# Patient Record
Sex: MALE | Race: WHITE | NOT HISPANIC OR LATINO | URBAN - METROPOLITAN AREA
[De-identification: names, ages, dates, MRNs, and addresses within clinical notes are randomized per-mention and may not be internally consistent; named-entity substitution may affect disease eponyms.]

---

## 2020-08-23 ENCOUNTER — INPATIENT (INPATIENT)
Facility: HOSPITAL | Age: 45
LOS: 1 days | Discharge: HOME | DRG: 872 | End: 2020-08-25
Admitting: HOSPITALIST
Payer: OTHER MISCELLANEOUS

## 2020-08-23 VITALS
SYSTOLIC BLOOD PRESSURE: 119 MMHG | DIASTOLIC BLOOD PRESSURE: 76 MMHG | RESPIRATION RATE: 18 BRPM | TEMPERATURE: 101 F | OXYGEN SATURATION: 99 % | WEIGHT: 244.93 LBS | HEART RATE: 99 BPM | HEIGHT: 73 IN

## 2020-08-23 LAB
ALBUMIN SERPL ELPH-MCNC: 3.6 G/DL — SIGNIFICANT CHANGE UP (ref 3.4–5)
ALP SERPL-CCNC: 56 U/L — SIGNIFICANT CHANGE UP (ref 40–120)
ALT FLD-CCNC: 41 U/L — SIGNIFICANT CHANGE UP (ref 12–42)
ANION GAP SERPL CALC-SCNC: 8 MMOL/L — LOW (ref 9–16)
APPEARANCE UR: CLEAR — SIGNIFICANT CHANGE UP
APTT BLD: 24.5 SEC — LOW (ref 27.5–35.5)
AST SERPL-CCNC: 32 U/L — SIGNIFICANT CHANGE UP (ref 15–37)
BASOPHILS # BLD AUTO: 0.05 K/UL — SIGNIFICANT CHANGE UP (ref 0–0.2)
BASOPHILS NFR BLD AUTO: 0.4 % — SIGNIFICANT CHANGE UP (ref 0–2)
BILIRUB SERPL-MCNC: 0.6 MG/DL — SIGNIFICANT CHANGE UP (ref 0.2–1.2)
BILIRUB UR-MCNC: NEGATIVE — SIGNIFICANT CHANGE UP
BUN SERPL-MCNC: 11 MG/DL — SIGNIFICANT CHANGE UP (ref 7–23)
CALCIUM SERPL-MCNC: 8.8 MG/DL — SIGNIFICANT CHANGE UP (ref 8.5–10.5)
CHLORIDE SERPL-SCNC: 101 MMOL/L — SIGNIFICANT CHANGE UP (ref 96–108)
CO2 SERPL-SCNC: 27 MMOL/L — SIGNIFICANT CHANGE UP (ref 22–31)
COLOR SPEC: YELLOW — SIGNIFICANT CHANGE UP
CREAT SERPL-MCNC: 1.04 MG/DL — SIGNIFICANT CHANGE UP (ref 0.5–1.3)
DIFF PNL FLD: NEGATIVE — SIGNIFICANT CHANGE UP
EOSINOPHIL # BLD AUTO: 0.03 K/UL — SIGNIFICANT CHANGE UP (ref 0–0.5)
EOSINOPHIL NFR BLD AUTO: 0.3 % — SIGNIFICANT CHANGE UP (ref 0–6)
GLUCOSE SERPL-MCNC: 104 MG/DL — HIGH (ref 70–99)
GLUCOSE UR QL: NEGATIVE — SIGNIFICANT CHANGE UP
HCT VFR BLD CALC: 41.2 % — SIGNIFICANT CHANGE UP (ref 39–50)
HGB BLD-MCNC: 14.8 G/DL — SIGNIFICANT CHANGE UP (ref 13–17)
IMM GRANULOCYTES NFR BLD AUTO: 0.5 % — SIGNIFICANT CHANGE UP (ref 0–1.5)
INR BLD: 1.13 — SIGNIFICANT CHANGE UP (ref 0.88–1.16)
KETONES UR-MCNC: NEGATIVE — SIGNIFICANT CHANGE UP
LACTATE SERPL-SCNC: 0.7 MMOL/L — SIGNIFICANT CHANGE UP (ref 0.4–2)
LEUKOCYTE ESTERASE UR-ACNC: NEGATIVE — SIGNIFICANT CHANGE UP
LYMPHOCYTES # BLD AUTO: 0.88 K/UL — LOW (ref 1–3.3)
LYMPHOCYTES # BLD AUTO: 7.5 % — LOW (ref 13–44)
MCHC RBC-ENTMCNC: 32.4 PG — SIGNIFICANT CHANGE UP (ref 27–34)
MCHC RBC-ENTMCNC: 35.9 GM/DL — SIGNIFICANT CHANGE UP (ref 32–36)
MCV RBC AUTO: 90.2 FL — SIGNIFICANT CHANGE UP (ref 80–100)
MONOCYTES # BLD AUTO: 0.74 K/UL — SIGNIFICANT CHANGE UP (ref 0–0.9)
MONOCYTES NFR BLD AUTO: 6.3 % — SIGNIFICANT CHANGE UP (ref 2–14)
NEUTROPHILS # BLD AUTO: 10 K/UL — HIGH (ref 1.8–7.4)
NEUTROPHILS NFR BLD AUTO: 85 % — HIGH (ref 43–77)
NITRITE UR-MCNC: NEGATIVE — SIGNIFICANT CHANGE UP
NRBC # BLD: 0 /100 WBCS — SIGNIFICANT CHANGE UP (ref 0–0)
PH UR: 6 — SIGNIFICANT CHANGE UP (ref 5–8)
PLATELET # BLD AUTO: 192 K/UL — SIGNIFICANT CHANGE UP (ref 150–400)
POTASSIUM SERPL-MCNC: 3.8 MMOL/L — SIGNIFICANT CHANGE UP (ref 3.5–5.3)
POTASSIUM SERPL-SCNC: 3.8 MMOL/L — SIGNIFICANT CHANGE UP (ref 3.5–5.3)
PROT SERPL-MCNC: 7.3 G/DL — SIGNIFICANT CHANGE UP (ref 6.4–8.2)
PROT UR-MCNC: NEGATIVE MG/DL — SIGNIFICANT CHANGE UP
PROTHROM AB SERPL-ACNC: 13.3 SEC — SIGNIFICANT CHANGE UP (ref 10.6–13.6)
RBC # BLD: 4.57 M/UL — SIGNIFICANT CHANGE UP (ref 4.2–5.8)
RBC # FLD: 13.1 % — SIGNIFICANT CHANGE UP (ref 10.3–14.5)
SARS-COV-2 RNA SPEC QL NAA+PROBE: SIGNIFICANT CHANGE UP
SODIUM SERPL-SCNC: 136 MMOL/L — SIGNIFICANT CHANGE UP (ref 132–145)
SP GR SPEC: 1.01 — SIGNIFICANT CHANGE UP (ref 1–1.03)
UROBILINOGEN FLD QL: 0.2 E.U./DL — SIGNIFICANT CHANGE UP
WBC # BLD: 11.76 K/UL — HIGH (ref 3.8–10.5)
WBC # FLD AUTO: 11.76 K/UL — HIGH (ref 3.8–10.5)

## 2020-08-23 PROCEDURE — 93010 ELECTROCARDIOGRAM REPORT: CPT

## 2020-08-23 PROCEDURE — 73130 X-RAY EXAM OF HAND: CPT | Mod: 26,LT

## 2020-08-23 PROCEDURE — 99218: CPT

## 2020-08-23 RX ORDER — AMPICILLIN SODIUM AND SULBACTAM SODIUM 250; 125 MG/ML; MG/ML
3 INJECTION, POWDER, FOR SUSPENSION INTRAMUSCULAR; INTRAVENOUS ONCE
Refills: 0 | Status: COMPLETED | OUTPATIENT
Start: 2020-08-23 | End: 2020-08-23

## 2020-08-23 RX ORDER — ACETAMINOPHEN 500 MG
975 TABLET ORAL ONCE
Refills: 0 | Status: COMPLETED | OUTPATIENT
Start: 2020-08-23 | End: 2020-08-23

## 2020-08-23 RX ORDER — AZTREONAM 2 G
1 VIAL (EA) INJECTION
Qty: 14 | Refills: 0
Start: 2020-08-23 | End: 2020-08-29

## 2020-08-23 RX ORDER — VANCOMYCIN HCL 1 G
1500 VIAL (EA) INTRAVENOUS ONCE
Refills: 0 | Status: COMPLETED | OUTPATIENT
Start: 2020-08-23 | End: 2020-08-23

## 2020-08-23 RX ORDER — KETOROLAC TROMETHAMINE 30 MG/ML
15 SYRINGE (ML) INJECTION ONCE
Refills: 0 | Status: DISCONTINUED | OUTPATIENT
Start: 2020-08-23 | End: 2020-08-24

## 2020-08-23 RX ORDER — SODIUM CHLORIDE 9 MG/ML
2500 INJECTION INTRAMUSCULAR; INTRAVENOUS; SUBCUTANEOUS ONCE
Refills: 0 | Status: COMPLETED | OUTPATIENT
Start: 2020-08-23 | End: 2020-08-23

## 2020-08-23 RX ORDER — KETOROLAC TROMETHAMINE 30 MG/ML
15 SYRINGE (ML) INJECTION ONCE
Refills: 0 | Status: DISCONTINUED | OUTPATIENT
Start: 2020-08-23 | End: 2020-08-23

## 2020-08-23 RX ADMIN — Medication 300 MILLIGRAM(S): at 10:30

## 2020-08-23 RX ADMIN — Medication 300 MILLIGRAM(S): at 23:11

## 2020-08-23 RX ADMIN — AMPICILLIN SODIUM AND SULBACTAM SODIUM 200 GRAM(S): 250; 125 INJECTION, POWDER, FOR SUSPENSION INTRAMUSCULAR; INTRAVENOUS at 22:34

## 2020-08-23 RX ADMIN — Medication 15 MILLIGRAM(S): at 10:24

## 2020-08-23 RX ADMIN — Medication 975 MILLIGRAM(S): at 22:34

## 2020-08-23 RX ADMIN — Medication 15 MILLIGRAM(S): at 20:35

## 2020-08-23 RX ADMIN — Medication 975 MILLIGRAM(S): at 20:35

## 2020-08-23 RX ADMIN — AMPICILLIN SODIUM AND SULBACTAM SODIUM 3 GRAM(S): 250; 125 INJECTION, POWDER, FOR SUSPENSION INTRAMUSCULAR; INTRAVENOUS at 23:11

## 2020-08-23 RX ADMIN — Medication 975 MILLIGRAM(S): at 20:50

## 2020-08-23 RX ADMIN — Medication 975 MILLIGRAM(S): at 10:21

## 2020-08-23 RX ADMIN — SODIUM CHLORIDE 2500 MILLILITER(S): 9 INJECTION INTRAMUSCULAR; INTRAVENOUS; SUBCUTANEOUS at 10:22

## 2020-08-23 NOTE — ED CDU PROVIDER DISPOSITION NOTE - CLINICAL COURSE
Observed in the ED with LUE elevation for cellulitis with persistent fever, body aches.  Added Unasyn to antibiotic regimen.  No tachycardia or hypotension.  Stable for RMF

## 2020-08-23 NOTE — ED CDU PROVIDER INITIAL DAY NOTE - MEDICAL DECISION MAKING DETAILS
Observed in the ED with worsening symptoms, fever, chills, body aches.  Did spike fever here.  Will add Unasyn and additional Toradol.  Will admit to [facility] for IV antibiotics.

## 2020-08-23 NOTE — ED PROVIDER NOTE - PROGRESS NOTE DETAILS
Labs and imaging reviewed. Slight leukocytosis with left shift, normal lactate, normal CMP. Hand x-ray without evidence of foreign body or fracture. Covid swab negative. Patient is feeling subjectively better. Place LUE in sling. Consider admission vs observation. Pt placed on observation for wound check, elevation, antibiotics, and cardiac monitoring. Labs and imaging reviewed. Slight leukocytosis with left shift, normal lactate, normal CMP. Hand x-ray without evidence of foreign body or fracture. Covid swab negative. Patient is feeling subjectively better. Place LUE in sling. Discussed admission vs observation. Pt placed on observation for wound check, elevation, antibiotics, and cardiac monitoring.

## 2020-08-23 NOTE — ED CDU PROVIDER INITIAL DAY NOTE - OBJECTIVE STATEMENT
Patient is a 45 year old male with no PMH presenting today reporting that he woke up this morning with pain and swelling of his left hand. He applied warm compress and the blister popped with some purulent drainage. He then developed pain, redness, swelling, body aches, and chills and presented to the ED for evaluation. Patient denies cough, vomiting, diarrhea, rash, no travel history, no known sick contacts, no known contact with known or suspected COVID19 positive persons, denies known insect bites. Patient works as a  and denies any recent injury.

## 2020-08-23 NOTE — ED PROVIDER NOTE - CLINICAL SUMMARY MEDICAL DECISION MAKING FREE TEXT BOX
Patient is a 45 year old male with no PMH presenting with left hand cellulitis. Triggered a sepsis alert on arrival due to fever and tachycardia. Gave adjusted weight based fluid, antibiotics, and pain medications, and ordered labs. Will keep his arm elevated, give IV antibiotics and consider admission from observation for cellulitis and sepsis. Have ordered COVID19 swab for admission purposes.

## 2020-08-23 NOTE — ED PROVIDER NOTE - CONSTITUTIONAL, MLM
normal... Well appearing, awake, alert, oriented to person, place, time/situation and in no apparent distress. Uncomfortable appearing,  awake, alert, oriented to person, place, time/situation and in no apparent distress.

## 2020-08-23 NOTE — ED CDU PROVIDER INITIAL DAY NOTE - PROGRESS NOTE DETAILS
Wound irrigated extensively with betadine, wound culture obtained and placed in bandage.  Continue to elevate arm.  Symptoms improved. Complained of chills and body aches again.  Temp spiked to 101.  Additional tylenol ordered.  No tachycardia or changes in blood pressure.  Due for second dose of IV Vancomycin at 1030 pm.  Will sign out to the evening team Patient feeling worse, Unasyn added, admitted to [facility] under Dr. Bates. Patient agreeable. Second line of streaking. No fluctuance. Arm marked with pen.

## 2020-08-23 NOTE — ED ADULT TRIAGE NOTE - CHIEF COMPLAINT QUOTE
Patient to ED with what appears to be an insect bite on left hand - area is red and cellulitic with purulent drainage.  Patient with fever and tachycardia.  Falls into sepsis protocol.  No acute

## 2020-08-23 NOTE — ED ADULT NURSE NOTE - OBJECTIVE STATEMENT
left hand redness, pain, and swelling since yesterday, also c/o fever and chills, no s/s of distress, awaiting provider eval

## 2020-08-23 NOTE — ED CDU PROVIDER INITIAL DAY NOTE - DETAILS
L hand cellulitis triggering sepsis alert on arrival.  Labs and imaging reviewed. Slight leukocytosis with left shift, normal lactate, normal CMP. Hand x-ray without evidence of foreign body or fracture. Covid swab negative. Patient is feeling subjectively better. Place LUE in sling. Pt placed on observation for wound check, elevation, antibiotics, and cardiac monitoring.

## 2020-08-23 NOTE — ED PROVIDER NOTE - NEUROLOGICAL, MLM
Alert and oriented, no focal deficits, no motor or sensory deficits. Rapid alternating movement, finger to nose. Hand: normal distal motor and sensory of medial, radial, and ulnar nerves. no pronator drift.

## 2020-08-23 NOTE — ED PROVIDER NOTE - SKIN, MLM
quarter sized ulcerated area on left dorsum on hand with surrounding erythema, lymphangitic spread up to distal and proximal arm, soft hand compartment, radial pulse +2, normal distal and capillary refill

## 2020-08-23 NOTE — ED PROVIDER NOTE - OBJECTIVE STATEMENT
Patient is a 45 year old male with no PMH presenting today reporting that he woke up this morning with pain and swelling of his left hand. He applied warm compress and the blister popped with some purulent drainage. He then developed pain, redness, swelling, body aches, and chills and presented to the ED for evaluation. Patient denies cough, vomiting, diarrhea, rash, no travel history, no known sick contacts, no known contact with known or suspect COVID19 positive persons, denies known insect bites. Patient works as a  and denies any recent injury. Patient is a 45 year old male with no PMH presenting today reporting that he woke up this morning with pain and swelling of his left hand. He applied warm compress and the blister popped with some purulent drainage. He then developed pain, redness, swelling, body aches, and chills and presented to the ED for evaluation. Patient denies cough, vomiting, diarrhea, rash, no travel history, no known sick contacts, no known contact with known or suspected COVID19 positive persons, denies known insect bites. Patient works as a  and denies any recent injury.

## 2020-08-23 NOTE — ED CDU PROVIDER DISPOSITION NOTE - ADMIT DISPOSITION PRESENT ON ADMISSION SEPSIS Q1 - RE-EVALUATED PATIENT FLUID AND VITAL SIGNS
I have re-evaluated the patient's fluid status and reviewed vital signs. Clinical perfusion assessment was performed.
Health Care Proxy (HCP)/SALINA [phone]

## 2020-08-24 ENCOUNTER — TRANSCRIPTION ENCOUNTER (OUTPATIENT)
Age: 45
End: 2020-08-24

## 2020-08-24 DIAGNOSIS — L03.114 CELLULITIS OF LEFT UPPER LIMB: ICD-10-CM

## 2020-08-24 DIAGNOSIS — A41.9 SEPSIS, UNSPECIFIED ORGANISM: ICD-10-CM

## 2020-08-24 DIAGNOSIS — R63.8 OTHER SYMPTOMS AND SIGNS CONCERNING FOOD AND FLUID INTAKE: ICD-10-CM

## 2020-08-24 LAB
ANION GAP SERPL CALC-SCNC: 6 MMOL/L — SIGNIFICANT CHANGE UP (ref 5–17)
BASOPHILS # BLD AUTO: 0.04 K/UL — SIGNIFICANT CHANGE UP (ref 0–0.2)
BASOPHILS NFR BLD AUTO: 0.4 % — SIGNIFICANT CHANGE UP (ref 0–2)
BUN SERPL-MCNC: 7 MG/DL — SIGNIFICANT CHANGE UP (ref 7–23)
CALCIUM SERPL-MCNC: 8.5 MG/DL — SIGNIFICANT CHANGE UP (ref 8.4–10.5)
CHLORIDE SERPL-SCNC: 108 MMOL/L — SIGNIFICANT CHANGE UP (ref 96–108)
CO2 SERPL-SCNC: 27 MMOL/L — SIGNIFICANT CHANGE UP (ref 22–31)
CREAT SERPL-MCNC: 0.83 MG/DL — SIGNIFICANT CHANGE UP (ref 0.5–1.3)
EOSINOPHIL # BLD AUTO: 0.17 K/UL — SIGNIFICANT CHANGE UP (ref 0–0.5)
EOSINOPHIL NFR BLD AUTO: 1.9 % — SIGNIFICANT CHANGE UP (ref 0–6)
GLUCOSE SERPL-MCNC: 101 MG/DL — HIGH (ref 70–99)
HCT VFR BLD CALC: 39.3 % — SIGNIFICANT CHANGE UP (ref 39–50)
HGB BLD-MCNC: 13.3 G/DL — SIGNIFICANT CHANGE UP (ref 13–17)
IMM GRANULOCYTES NFR BLD AUTO: 0.6 % — SIGNIFICANT CHANGE UP (ref 0–1.5)
LYMPHOCYTES # BLD AUTO: 1.15 K/UL — SIGNIFICANT CHANGE UP (ref 1–3.3)
LYMPHOCYTES # BLD AUTO: 12.8 % — LOW (ref 13–44)
MAGNESIUM SERPL-MCNC: 1.7 MG/DL — SIGNIFICANT CHANGE UP (ref 1.6–2.6)
MCHC RBC-ENTMCNC: 31.7 PG — SIGNIFICANT CHANGE UP (ref 27–34)
MCHC RBC-ENTMCNC: 33.8 GM/DL — SIGNIFICANT CHANGE UP (ref 32–36)
MCV RBC AUTO: 93.8 FL — SIGNIFICANT CHANGE UP (ref 80–100)
MONOCYTES # BLD AUTO: 0.84 K/UL — SIGNIFICANT CHANGE UP (ref 0–0.9)
MONOCYTES NFR BLD AUTO: 9.3 % — SIGNIFICANT CHANGE UP (ref 2–14)
MRSA PCR RESULT.: NEGATIVE — SIGNIFICANT CHANGE UP
NEUTROPHILS # BLD AUTO: 6.76 K/UL — SIGNIFICANT CHANGE UP (ref 1.8–7.4)
NEUTROPHILS NFR BLD AUTO: 75 % — SIGNIFICANT CHANGE UP (ref 43–77)
NRBC # BLD: 0 /100 WBCS — SIGNIFICANT CHANGE UP (ref 0–0)
PHOSPHATE SERPL-MCNC: 2.3 MG/DL — LOW (ref 2.5–4.5)
PLATELET # BLD AUTO: 160 K/UL — SIGNIFICANT CHANGE UP (ref 150–400)
POTASSIUM SERPL-MCNC: 4.1 MMOL/L — SIGNIFICANT CHANGE UP (ref 3.5–5.3)
POTASSIUM SERPL-SCNC: 4.1 MMOL/L — SIGNIFICANT CHANGE UP (ref 3.5–5.3)
RBC # BLD: 4.19 M/UL — LOW (ref 4.2–5.8)
RBC # FLD: 13.2 % — SIGNIFICANT CHANGE UP (ref 10.3–14.5)
S AUREUS DNA NOSE QL NAA+PROBE: NEGATIVE — SIGNIFICANT CHANGE UP
SODIUM SERPL-SCNC: 141 MMOL/L — SIGNIFICANT CHANGE UP (ref 135–145)
WBC # BLD: 9.01 K/UL — SIGNIFICANT CHANGE UP (ref 3.8–10.5)
WBC # FLD AUTO: 9.01 K/UL — SIGNIFICANT CHANGE UP (ref 3.8–10.5)

## 2020-08-24 PROCEDURE — 99254 IP/OBS CNSLTJ NEW/EST MOD 60: CPT | Mod: GC

## 2020-08-24 PROCEDURE — 99223 1ST HOSP IP/OBS HIGH 75: CPT | Mod: GC

## 2020-08-24 RX ORDER — ACETAMINOPHEN 500 MG
650 TABLET ORAL EVERY 4 HOURS
Refills: 0 | Status: DISCONTINUED | OUTPATIENT
Start: 2020-08-24 | End: 2020-08-25

## 2020-08-24 RX ORDER — VANCOMYCIN HCL 1 G
1500 VIAL (EA) INTRAVENOUS EVERY 12 HOURS
Refills: 0 | Status: DISCONTINUED | OUTPATIENT
Start: 2020-08-24 | End: 2020-08-24

## 2020-08-24 RX ORDER — CEPHALEXIN 500 MG
500 CAPSULE ORAL
Refills: 0 | Status: DISCONTINUED | OUTPATIENT
Start: 2020-08-24 | End: 2020-08-25

## 2020-08-24 RX ORDER — ENOXAPARIN SODIUM 100 MG/ML
40 INJECTION SUBCUTANEOUS EVERY 24 HOURS
Refills: 0 | Status: DISCONTINUED | OUTPATIENT
Start: 2020-08-24 | End: 2020-08-25

## 2020-08-24 RX ORDER — VALACYCLOVIR 500 MG/1
0 TABLET, FILM COATED ORAL
Qty: 21 | Refills: 0 | DISCHARGE

## 2020-08-24 RX ORDER — LANOLIN ALCOHOL/MO/W.PET/CERES
5 CREAM (GRAM) TOPICAL AT BEDTIME
Refills: 0 | Status: DISCONTINUED | OUTPATIENT
Start: 2020-08-24 | End: 2020-08-25

## 2020-08-24 RX ADMIN — Medication 100 MILLIGRAM(S): at 19:00

## 2020-08-24 RX ADMIN — Medication 15 MILLIGRAM(S): at 03:41

## 2020-08-24 RX ADMIN — ENOXAPARIN SODIUM 40 MILLIGRAM(S): 100 INJECTION SUBCUTANEOUS at 06:08

## 2020-08-24 RX ADMIN — Medication 500 MILLIGRAM(S): at 19:00

## 2020-08-24 RX ADMIN — Medication 500 MILLIGRAM(S): at 23:55

## 2020-08-24 RX ADMIN — Medication 1500 MILLIGRAM(S): at 01:28

## 2020-08-24 RX ADMIN — Medication 15 MILLIGRAM(S): at 04:45

## 2020-08-24 RX ADMIN — Medication 5 MILLIGRAM(S): at 23:55

## 2020-08-24 NOTE — DISCHARGE NOTE PROVIDER - CARE PROVIDER_API CALL
Mehul Thomas  INTERNAL MEDICINE  [address] [place] [address]  Phone: [phone]  Fax: [phone]  Follow Up Time:     Charlotte David  [address] [address]  Phone: [phone]  Fax: [phone]  Follow Up Time:     Kelechi Rachel [facility]   [address] [place] [address]  Phone: (   )    -  Fax: (   )    -  Follow Up Time: Mehul Thomas  INTERNAL MEDICINE  [address] [place] [address]  Phone: [phone]  Fax: [phone]  Scheduled Appointment: 09/21/2020 11:00 AM    Marv Solano  INTERNAL MEDICINE  [address] [place] [address]  Phone: [phone]  Fax: [phone]  Follow Up Time:

## 2020-08-24 NOTE — PROGRESS NOTE ADULT - PROBLEM SELECTOR PLAN 1
Sxs of fevers, chills, malaise, and left hand cellulitis w/purulent carbuncle with concern of erythema spreading to LUE @ [facility], no hx of trauma/IVDA, vitals sig for 101.2F, HR 99, marked erythema of left hand up to left AC, Carbuncle expressing purulent exudate on exam however no fluctuance noted, LUE neurovascularly intact, lactate 0.7 and has UOP, left hand XR no evidence of fx, sepsis likely 2/2 to sepsis  -f/u BCX and wound culture   -Nursing wound care, appreciated   -monitor cellulitis w/demarcation  -c/w Vanc 1.5mg BID (trough before 4th dose, goal of 10-15), received 1 dose so far w/next one due @10am 08/24/2020  -If worsening erythema, will consider hand consult for I&D Sxs of fevers, chills, malaise, and left hand cellulitis w/purulent carbuncle with concern of erythema spreading to LUE @ [facility], no hx of trauma/IVDA, vitals sig for 101.2F, HR 99, marked erythema of left hand up to left AC, Carbuncle expressing purulent exudate on exam however no fluctuance noted, LUE neurovascularly intact, lactate 0.7 and has UOP, left hand XR no evidence of fx, sepsis likely 2/2 to sepsis  -f/u Blood culture and wound culture   -Nursing wound care, appreciated   -monitor cellulitis w/demarcation  -c/w Vanc 1.5mg BID (trough before 4th dose, goal of 10-15), received 1 dose so far w/next one due @10am 08/24/2020  - ID consult ( pt also has x5 suprapubic annular 1 cm in diameter, crusty, non pruritic lesions that first appeared 1 week ago with pus after being in the pool-- unclear if related to hand cellulitis )  - F/U [id number] Syphilis screen

## 2020-08-24 NOTE — PROGRESS NOTE ADULT - PROBLEM SELECTOR PLAN 4
Fluids: s/p 2.5L NS  Electrolytes: Mg>2, K>4  Nutrition:  No IVF currently needed, replete lytes PRN  Prophylaxis: Lovenox    Activity: AAT, OOBTC  GI: none  C: FC  Dispo: Admit to F

## 2020-08-24 NOTE — PROGRESS NOTE ADULT - ASSESSMENT
44yo M HDO1 presented with erythema, swelling, and pain of the left hand found to be septic likely 2/2 to cellulitis s/p vanc/unasyn. Admitted to medicine for further management. 44yo M HDO1 presented with erythema, swelling, and pain of the left hand found to be septic likely due to left hand purulent cellulitis. Patient now s/p vanc/unasyn in ED + 1 x Unasyn dose o/n and currently on vancomycin infusion. Admitted to medicine for further management.

## 2020-08-24 NOTE — DISCHARGE NOTE PROVIDER - NSDCCPCAREPLAN_GEN_ALL_CORE_FT
PRINCIPAL DISCHARGE DIAGNOSIS  Diagnosis: Cellulitis of hand, left  Assessment and Plan of Treatment: treated with IV antibiotics: Vancomycin and Unasyn PRINCIPAL DISCHARGE DIAGNOSIS  Diagnosis: Cellulitis of hand, left  Assessment and Plan of Treatment: You presented to the Emergency department due to left hand cellulitis and admitted to the medicine team for treatment with IV antibiotics, specifically infusion of Vancomycin and 1 time dose of Unasyn. You were given ketorolac and tylenol for pain which was well controlled. PRINCIPAL DISCHARGE DIAGNOSIS  Diagnosis: Cellulitis of hand, left  Assessment and Plan of Treatment: You presented to the Emergency department due to left hand cellulitis and admitted to the medicine team for treatment with IV antibiotics, specifically infusion of Vancomycin and 1 time dose of Unasyn. You were given ketorolac and tylenol for pain which was well controlled. The culture of the cellulitis grew Group A strep, and was negative for MRSA. Please take the cephalexin for 7 days.

## 2020-08-24 NOTE — H&P ADULT - NSHPPHYSICALEXAM_GEN_ALL_CORE
.  VITAL SIGNS:  T(F): 98.2 (08-24-20 @ 03:44), Max: 101.2 (08-23-20 @ 09:12)  HR: 86 (08-24-20 @ 03:44) (77 - 99)  BP: 128/82 (08-24-20 @ 03:44) (116/60 - 149/69)  BP(mean): --  RR: 16 (08-24-20 @ 03:44) (16 - 18)  SpO2: 96% (08-24-20 @ 03:44) (94% - 100%)    PHYSICAL EXAM:    Constitutional: WDWN resting comfortably in bed; NAD  HEENT: NC/AT, PERRL, EOMI, anicteric sclera, no nasal discharge; uvula midline, no oropharyngeal erythema or exudates; MMM  Neck: supple; no JVD or thyromegaly  Respiratory: unlabored breathing, CTA B/L; no W/Rhonchi/Crackles, no retractions or use of accessory muscles   Cardiac: +S1/S2; RRR; no M/R/G; No ventricular heaves, PMI non-displaced  Gastrointestinal: soft, NT/ND; no rebound or guarding; +BSx4  Genitourinary: normal external genitalia  Back: spine midline, no bony tenderness or step-offs; no CVAT B/L  Extremities: WWP, no clubbing or cyanosis; no peripheral edema  Musculoskeletal: NROM x4; no joint swelling, tenderness or erythema  Vascular: 2+ radial, DP/PT pulses B/L  Dermatologic: skin warm, dry and intact; no rashes, wounds, or scars  Lymphatic: no submandibular or cervical LAD  Neurologic: AAOx3; CNII-XII grossly intact; no focal deficits  Psychiatric: affect and characteristics of appearance, verbalizations, behaviors are appropriate, denies SI/HI/AH/VH .  VITAL SIGNS:  T(F): 98.2 (08-24-20 @ 03:44), Max: 101.2 (08-23-20 @ 09:12)  HR: 86 (08-24-20 @ 03:44) (77 - 99)  BP: 128/82 (08-24-20 @ 03:44) (116/60 - 149/69)  BP(mean): --  RR: 16 (08-24-20 @ 03:44) (16 - 18)  SpO2: 96% (08-24-20 @ 03:44) (94% - 100%)    PHYSICAL EXAM:    Constitutional: WDWN resting comfortably in bed; NAD  HEENT: NC/AT, PERRL, EOMI, anicteric sclera, no nasal discharge; uvula midline, no oropharyngeal erythema or exudates; MMM  Neck: supple; no JVD or thyromegaly  Respiratory: unlabored breathing, CTA B/L; no W/Rhonchi/Crackles, no retractions or use of accessory muscles   Cardiac: +S1/S2; RRR; no M/R/G; No ventricular heaves, PMI non-displaced  Gastrointestinal: soft, NT/ND; no rebound or guarding; +BSx4  Genitourinary: normal external genitalia  Back: spine midline, no bony tenderness or step-offs; no CVAT B/L  Extremities: WWP, no clubbing or cyanosis; no peripheral edema  Musculoskeletal: NROM x3; limited flexion of Left hand, no joint swelling, tenderness or erythema  Vascular: 2+ radial, DP/PT pulses B/L  Dermatologic: Left hand edema and mike carbuncle expressing purulent exudate with erythema from left hand to left AC, skin warm, dry; no rashes  Lymphatic: no submandibular or cervical LAD  Neurologic: AAOx3; CNII-XII grossly intact; no focal deficits  Psychiatric: affect and characteristics of appearance, verbalizations, behaviors are appropriate, denies SI/HI/AH/VH .  VITAL SIGNS:  T(F): 98.2 (08-24-20 @ 03:44), Max: 101.2 (08-23-20 @ 09:12)  HR: 86 (08-24-20 @ 03:44) (77 - 99)  BP: 128/82 (08-24-20 @ 03:44) (116/60 - 149/69)  BP(mean): --  RR: 16 (08-24-20 @ 03:44) (16 - 18)  SpO2: 96% (08-24-20 @ 03:44) (94% - 100%)    PHYSICAL EXAM:    Constitutional: WDWN resting comfortably in bed; NAD  HEENT: NC/AT, PERRL, EOMI, anicteric sclera, no nasal discharge; uvula midline, no oropharyngeal erythema or exudates; MMM  Neck: supple; no JVD or thyromegaly  Respiratory: unlabored breathing, CTA B/L; no W/Rhonchi/Crackles, no retractions or use of accessory muscles   Cardiac: +S1/S2; RRR; no M/R/G; No ventricular heaves, PMI non-displaced  Gastrointestinal: soft, NT/ND; no rebound or guarding; +BSx4  Genitourinary: normal external genitalia  Back: spine midline, no bony tenderness or step-offs; no CVAT B/L  Extremities: WWP, no clubbing or cyanosis; no peripheral edema  Musculoskeletal: NROM x3; limited flexion of Left hand, no joint swelling, tenderness or erythema  Vascular: 2+ radial, DP/PT pulses B/L  Dermatologic: Left hand edema and mike 3x3cm carbuncle expressing purulent exudate with erythema from left hand to left AC, skin warm, dry; no rashes  Lymphatic: no submandibular or cervical LAD  Neurologic: AAOx3; CNII-XII grossly intact; no focal deficits  Psychiatric: affect and characteristics of appearance, verbalizations, behaviors are appropriate, denies SI/HI/AH/VH .  VITAL SIGNS:  T(F): 98.2 (08-24-20 @ 03:44), Max: 101.2 (08-23-20 @ 09:12)  HR: 86 (08-24-20 @ 03:44) (77 - 99)  BP: 128/82 (08-24-20 @ 03:44) (116/60 - 149/69)  BP(mean): --  RR: 16 (08-24-20 @ 03:44) (16 - 18)  SpO2: 96% (08-24-20 @ 03:44) (94% - 100%)    PHYSICAL EXAM:    Constitutional: WDWN resting comfortably in bed; NAD  HEENT: NC/AT, PERRL, EOMI, anicteric sclera, no nasal discharge; uvula midline, no oropharyngeal erythema or exudates; MMM  Neck: supple; no JVD or thyromegaly  Respiratory: unlabored breathing, CTA B/L; no W/Rhonchi/Crackles, no retractions or use of accessory muscles   Cardiac: +S1/S2; RRR; no M/R/G; No ventricular heaves, PMI non-displaced  Gastrointestinal: soft, NT/ND; no rebound or guarding; +BSx4  Genitourinary: normal external genitalia  Back: spine midline, no bony tenderness or step-offs; no CVAT B/L  Extremities: WWP, no clubbing or cyanosis; no peripheral edema  Musculoskeletal: NROM x3; limited flexion of Left hand digits (neurovascularly intact), no joint swelling, tenderness or erythema  Vascular: 2+ radial, DP/PT pulses B/L  Dermatologic: Left hand edema and mike 3x3cm carbuncle expressing purulent exudate with erythema from left hand to left AC, skin warm, dry; no rashes  Lymphatic: no submandibular or cervical LAD  Neurologic: AAOx3; CNII-XII grossly intact; no focal deficits  Psychiatric: affect and characteristics of appearance, verbalizations, behaviors are appropriate, denies SI/HI/AH/VH

## 2020-08-24 NOTE — DISCHARGE NOTE PROVIDER - HOSPITAL COURSE
#Discharge:        Patient is __ yo M/F with past medical history of _____    44yo M with PMH of acid reflux presents with erythema, swelling, and pain of the left hand found to be septic likely due to associated left hand cellulitis. Patient was treatedwith vancomycin and unasyn and then transitioned to oral __________         Problem List/Main Diagnoses (system-based):     # Problem/Plan - 1: Sepsis.      Plan: Sxs of fevers, chills, malaise, and left hand cellulitis w/purulent carbuncle with concern of erythema spreading to LUE @ [facility], no hx of trauma/IVDA, vitals sig for 101.2F, HR 99, marked erythema of left hand up to left AC, Carbuncle expressing purulent exudate on exam however no fluctuance noted, LUE neurovascularly intact, lactate 0.7 and has UOP, left hand XR no evidence of fx, sepsis likely 2/2 to sepsis    -f/u Blood culture and wound culture     -Nursing wound care, appreciated     -monitor cellulitis w/demarcation    -c/w Vanc 1.5mg BID (trough before 4th dose, goal of 10-15), received 1 dose so far w/next one due @10am 08/24/2020    - ID consult ( pt also has x5 suprapubic annular 1 cm in diameter, crusty, non pruritic lesions that first appeared 1 week ago with pus after being in the pool-- unclear if related to hand cellulitis )    - F/U [facility] Syphilis screen.         # Problem/Plan - 2: Cellulitis of hand, left.      Plan: see plan above.         # Problem/Plan - 3: R/O Skin lesion on examination.      Plan: x5 suprapubic/ groin annular 1 cm in diameter, crusty, mildly raised, non pruritic lesions that first appeared 1 week ago with pus after being in the pool-- unclear if related to hand cellulitis. Differential includes syphilis vs gonorrhea vs tinea cruris (less likely since non itchy).    - ID consult    - F/U [facility] Syphilis screen.         Inpatient treatment course:     Treated with Unasyn and Vancomycin for MRSA coverage and transitioned to __________    Blood Culture 8/23:    Wound Culture 8/23:        New medications:     --        Labs to be followed outpatient:     --    Exam to be followed outpatient:     Outpatient Resident Clinic with Dr. Denver Hamilton #Discharge:        46yo M with PMH of acid reflux presents with erythema, swelling, and pain of the left hand found to be septic likely due to associated left hand cellulitis. Patient was treatedwith vancomycin and unasyn and then transitioned to oral __________         Problem List/Main Diagnoses (system-based):     # Problem/Plan - 1: Sepsis.      Plan: Sxs of fevers, chills, malaise, and left hand cellulitis w/purulent carbuncle with concern of erythema spreading to LUE @ [facility], no hx of trauma/IVDA, vitals sig for 101.2F, HR 99, marked erythema of left hand up to left AC, Carbuncle expressing purulent exudate on exam however no fluctuance noted, LUE neurovascularly intact, lactate 0.7 and has UOP, left hand XR no evidence of fx, sepsis likely 2/2 to sepsis    -f/u Blood culture and wound culture     -Nursing wound care, appreciated     -monitor cellulitis w/demarcation    -c/w Vanc 1.5mg BID (trough before 4th dose, goal of 10-15), received 1 dose so far w/next one due @10am 08/24/2020    - ID consult ( pt also has x5 suprapubic annular 1 cm in diameter, crusty, non pruritic lesions that first appeared 1 week ago with pus after being in the pool-- unclear if related to hand cellulitis )    - F/U [facility] Syphilis screen.         # Problem/Plan - 2: Cellulitis of hand, left.      Plan: see plan above.         # Problem/Plan - 3: R/O Skin lesion on examination.      Plan: x5 suprapubic/ groin annular 1 cm in diameter, crusty, mildly raised, non pruritic lesions that first appeared 1 week ago with pus after being in the pool-- unclear if related to hand cellulitis. Differential includes syphilis vs gonorrhea vs tinea cruris (less likely since non itchy).    - ID consult    - F/U [facility] Syphilis screen.         Inpatient treatment course:     Treated with Unasyn and Vancomycin for MRSA coverage and transitioned to __________    Blood Culture 8/23:    Wound Culture 8/23:        New medications:     --        Labs to be followed outpatient:     --    Exam to be followed outpatient:     Outpatient Resident Clinic with Dr. Denver Hamilton #Discharge:        46yo M with PMH of acid reflux presents with erythema, swelling, and pain of the left hand found to be septic likely due to associated left hand cellulitis. Patient was treated with vancomycin and 1 x dose unasyn 1x vancomycin and then transitioned to oral Doxycyline and Keflex per ID recommendations        Problem List/Main Diagnoses (system-based):     # Problem/Plan - 1: Sepsis.      Plan: Sxs of fevers, chills, malaise, and left hand cellulitis w/purulent carbuncle with concern of erythema spreading to LUE @ [facility], no hx of trauma/IVDA, vitals sig for 101.2F, HR 99, marked erythema of left hand up to left AC, Carbuncle expressing purulent exudate on exam however no fluctuance noted, LUE neurovascularly intact, lactate 0.7 and has UOP, left hand XR no evidence of fx, sepsis likely 2/2 to sepsis    -f/u Blood culture and wound culture     -Nursing wound care, appreciated     -monitor cellulitis w/demarcation    -c/w Vanc 1.5mg BID (trough before 4th dose, goal of 10-15), received 1 dose so far w/next one due @10am 08/24/2020    - ID consult ( pt also has x5 suprapubic annular 1 cm in diameter, crusty, non pruritic lesions that first appeared 1 week ago with pus after being in the pool-- unclear if related to hand cellulitis )    - F/U [facility] Syphilis screen.         # Problem/Plan - 2: Cellulitis of hand, left.      Plan: see plan above.         # Problem/Plan - 3: R/O Skin lesion on examination.      Plan: x5 suprapubic/ groin annular 1 cm in diameter, crusty, mildly raised, non pruritic lesions that first appeared 1 week ago with pus after being in the pool-- unclear if related to hand cellulitis. Differential includes syphilis vs gonorrhea vs tinea cruris (less likely since non itchy).    - ID consult    - F/U [facility] Syphilis screen.         Inpatient treatment course:     Treated with Unasyn and Vancomycin for MRSA coverage and transitioned to Doxycyline and Keflex per ID recommendations    Blood Culture 8/23:    Wound Culture 8/23:        New medications:     --        Labs to be followed outpatient:     --    Exam to be followed outpatient:     Outpatient Resident Clinic with Dr. Denver Hamilton #Discharge:        46yo M with PMH of acid reflux presents with erythema, swelling, and pain of the left hand found to be septic likely due to associated left hand cellulitis. Patient was treated with vancomycin and 1 x dose unasyn 1x vancomycin and then transitioned to oral Doxycyline and Keflex per ID recommendations        Problem List/Main Diagnoses (system-based):     # Problem/Plan - 1: Sepsis.      Plan: Sxs of fevers, chills, malaise, and left hand cellulitis w/purulent carbuncle with concern of erythema spreading to LUE @ [facility], no hx of trauma/IVDA, vitals sig for 101.2F, HR 99, marked erythema of left hand up to left AC, Carbuncle expressing purulent exudate on exam however no fluctuance noted, LUE neurovascularly intact, lactate 0.7 and has UOP, left hand XR no evidence of fx, sepsis likely 2/2 to sepsis    -f/u Blood culture and wound culture     -Nursing wound care, appreciated     -monitor cellulitis w/demarcation    -c/w Vanc 1.5mg BID (trough before 4th dose, goal of 10-15), received 1 dose so far w/next one due @10am 08/24/2020    - ID consult ( pt also has x5 suprapubic annular 1 cm in diameter, crusty, non pruritic lesions that first appeared 1 week ago with pus after being in the pool-- unclear if related to hand cellulitis )    - F/U [facility] Syphilis screen.         # Problem/Plan - 2: Cellulitis of hand, left.      Plan: see plan above.         # Problem/Plan - 3: R/O Skin lesion on examination.      Plan: x5 suprapubic/ groin annular 1 cm in diameter, crusty, mildly raised, non pruritic lesions that first appeared 1 week ago with pus after being in the pool-- unclear if related to hand cellulitis. Differential includes syphilis vs gonorrhea vs tinea cruris (less likely since non itchy).    - ID consult    - F/U [facility] Syphilis screen.         Inpatient treatment course:     Treated with Unasyn and Vancomycin for MRSA coverage and transitioned to Doxycyline and Keflex per ID recommendations    Blood Culture 8/23:    Wound Culture 8/23:        New medications:     --        Labs to be followed outpatient:     --    Exam to be followed outpatient:     Outpatient Resident Clinic with Dr. Denver Rachel #Discharge:        46yo M with PMH of acid reflux presents with erythema, swelling, and pain of the left hand found to be septic likely due to associated left hand cellulitis.         Problem List/Main Diagnoses (system-based):     # Problem/Plan - 1: Sepsis:  Hospital course below, treated with vanc and zosyn initially. To finish cephalexin for 7 days.          # Problem/Plan - 2: Cellulitis of hand, left.          # Problem/Plan - 3: Suprapubic/ groin annular 1 cm in diameter, crusty, mildly raised, non pruritic lesions that first appeared 1 week ago with pus after being in the pool. syphilis, gonorrhea still pending, will need to follow up with PCP for results.         Inpatient treatment course: Sxs of fevers, chills, malaise, and left hand cellulitis w/purulent carbuncle with concern of erythema spreading to LUE @ [facility], no hx of trauma/IVDA, vitals sig for 101.2F, HR 99, marked erythema of left hand up to left AC, Carbuncle expressing purulent exudate on exam however no fluctuance noted, LUE neurovascularly intact, lactate 0.7 and has UOP, left hand XR no evidence of fx, sepsis likely 2/2 cellulitis. Patient was treated with vancomycin and 1 x dose unasyn 1x vancomycin and then transitioned to oral Doxycyline and Keflex to cover potential MRSA and rickettsia. Bcx were negative, wound culture grew GAS. Discontinued doxycycline.             New medications: cephalexin 500mg [id number] for 7 more days.    --        Labs to be followed outpatient: Syphilis, rickettsia, G/C urine    --    Exam to be followed outpatient:     Primary care doctor- Marv Solano

## 2020-08-24 NOTE — PROGRESS NOTE ADULT - PROBLEM SELECTOR PLAN 3
Fluids: s/p 2.5L NS  Electrolytes: Mg>2, K>4  Nutrition:  No IVF currently needed, replete lytes PRN  Prophylaxis: Lovenox    Activity: AAT, OOBTC  GI: none  C: FC  Dispo: Admit to F x5 suprapubic/ groin annular 1 cm in diameter, crusty, mildly raised, non pruritic lesions that first appeared 1 week ago with pus after being in the pool-- unclear if related to hand cellulitis. Differential includes syphilis vs gonorrhea vs tinea cruris (less likely since non itchy).  - ID consult  - F/U [facility] Syphilis screen

## 2020-08-24 NOTE — H&P ADULT - NSHPLABSRESULTS_GEN_ALL_CORE
.  LABS:                         14.8   11.76 )-----------( 192      ( 23 Aug 2020 10:28 )             41.2     08    136  |  101  |  11  ----------------------------<  104<H>  3.8   |  27  |  1.04    Ca    8.8      23 Aug 2020 10:28    TPro  7.3  /  Alb  3.6  /  TBili  0.6  /  DBili  x   /  AST  32  /  ALT  41  /  AlkPhos  56  08-23    PT/INR - ( 23 Aug 2020 10:28 )   PT: 13.3 sec;   INR: 1.13          PTT - ( 23 Aug 2020 10:28 )  PTT:24.5 sec  Urinalysis Basic - ( 23 Aug 2020 16:06 )    Color: Yellow / Appearance: Clear / S.015 / pH: x  Gluc: x / Ketone: NEGATIVE  / Bili: NEGATIVE / Urobili: 0.2 E.U./dL   Blood: x / Protein: NEGATIVE mg/dL / Nitrite: NEGATIVE   Leuk Esterase: NEGATIVE / RBC: x / WBC x   Sq Epi: x / Non Sq Epi: x / Bacteria: x                RADIOLOGY, EKG & ADDITIONAL TESTS: Reviewed.

## 2020-08-24 NOTE — PROGRESS NOTE ADULT - SUBJECTIVE AND OBJECTIVE BOX
45y M no pmhx presented to [facility] c/o left hand pain and mike wound. Yesterday morning, the pt woke up w/nonradiating sharp elbow pain (6/10) and discovered that his left hand was red, swollen, and painful (sharp/nonradiating 8/10) on flexion of digits. Ice and Tylenol relieved the pain. There was an accompanying blister on his mike that was leaking yellow pus when applying ice. Later that day, he developed sxs of general malaise, sweats, chills, and fever to 100F. Of note the pt is a  on [place] and [facility] near [facility], he denies IVDA, trauma, bites to the affected area, or recent travel hx.     On ROS, Patient denies: ROS negative except for HPI. Myalgias, dizziness, weakness, HA, dysphagia, dysarthria, changes in vision, CP/chest discomfort, palpitations, SOB, cough, PND, orthopnea, N/V/D/C, abdominal pain, dysuria, urinary urgency/increased frequency, changes in bowel movements, melena, hematochezia, LE edema, or joint pain. ROS otherwise negative.    In the ED: 2.5L NS, Toradol 15mg 1x, 975mg tylenol, vanc 1.5g, unasyn 3g  Initial vital signs: T: 101.2 HR: 99 , BP: 119/76, R: 18, SpO2: 99% on RA  Labs: significant for WBC 11.76 w/PMN predominance, lactate 0.7    1x dose of Vancomycin, 1x Toradol, 1x Acetaminophen, 1x bolus Normal Saline  Imaging :XR HAND 3 views of the left hand demonstrates surgical screw overlying the navicular bone. No evidence of acute fracture or dislocation. No radiographic evidence of osteomyelitis. . IMPRESSION: No evidence of acute fracture   EKG: NSR    Urinalysis Basic - ( 23 Aug 2020 16:06 )    Color: Yellow / Appearance: Clear / S.015 / pH: x  Gluc: x / Ketone: NEGATIVE  / Bili: NEGATIVE / Urobili: 0.2 E.U./dL   Blood: x / Protein: NEGATIVE mg/dL / Nitrite: NEGATIVE   Leuk Esterase: NEGATIVE / RBC: x / WBC x   Sq Epi: x / Non Sq Epi: x / Bacteria: x        PAST MEDICAL & SURGICAL HISTORY:  No pertinent past medical history  No significant past surgical history    Home Medications: None  naproxen 500 mg oral tablet: Last Dose Taken:  , 1 tab(s) orally 2 times a day  Bactrim  mg-160 mg oral tablet: Last Dose Taken:  , 1 tab(s) orally 2 times a day    Allergies: No Known Allergies, Intolerances    Social History:  Marital Status:  (   )    (   ) Single    (   )    (  )   Lives with: (  ) alone  (  ) children   (  ) spouse   (  ) parents  (  ) other  Recent Travel: No recent travel  Occupation: ____________  Mobility: ________________  Substance Use (street drugs): ( x ) never used  (  ) other:  Tobacco Usage:  ( x  ) never smoked   (   ) former smoker   (   ) current smoker  (     ) pack year  Alcohol Usage: None (24 Aug 2020 04:50)    Overnight Events:  - No acute events overnight. Tolerating PO, voiding, ambulating, afebrile ?  - Medications overnight ? --> 1x 15 mg toradol overnight (3 AM), 1x dose of 3g unasyn overnight (10 PM), 1X Acetaminophen (10 PM), Infusion of Vancomycin.  - Fluids ?     Vital Signs Last 24 Hrs  T(C): 36.9 (24 Aug 2020 04:45), Max: 38.4 (23 Aug 2020 09:12)  T(F): 98.4 (24 Aug 2020 04:45), Max: 101.2 (23 Aug 2020 09:12)  HR: 77 (24 Aug 2020 04:45) (77 - 99)  BP: 110/65 (24 Aug 2020 04:45) (110/65 - 149/69)  BP(mean): --  RR: 14 (24 Aug 2020 04:45) (14 - 18)  SpO2: 97% (24 Aug 2020 04:45) (94% - 100%)    MEDICATIONS  (STANDING):  enoxaparin Injectable 40 milliGRAM(s) SubCutaneous every 24 hours  vancomycin  IVPB 1500 milliGRAM(s) IV Intermittent every 12 hours    MEDICATIONS  (PRN):  acetaminophen   Tablet .. 650 milliGRAM(s) Oral every 4 hours PRN Mild Pain (1 - 3)    Labs [ ] 45y M no pmhx presented to [facility] c/o left hand pain and mike wound. Yesterday morning, the pt woke up w/nonradiating sharp elbow pain (6/10) and discovered that his left hand was red, swollen, and painful (sharp/nonradiating 8/10) on flexion of digits. Ice and Tylenol relieved the pain. There was an accompanying blister on his dorsal hand that was leaking yellow pus when applying ice. Later that day, he developed sxs of general malaise, sweats, chills, and fever to 100F. Of note the pt is a  on [place] and [facility] near [facility] and reports to have been at the firehouse Saturday before the symptoms started. He denies IVDA, trauma, bites to the affected area, but admits to travel to [place] 1 week ago where he got in the pool and hot tub. While on his trip to [place] he also had unprotected sex with a female partner. He reports two days after his trip he first noticed skin lesion in his suprapubic/ groin region. He reports the skin lesions were discharging a green fluid and it is unclear if it was purulent. The skin lesions were mildly painful when boxer briefs pressed on them, but otherwise non-pain/ non pruiritc when left alone. He denies any dysuria, changes in urine, and reports that his partner did not have any urinary symptoms either. It has been more than one year since he was tested for STD's.      On ROS, Patient denies: ROS negative except for HPI. Myalgias, dizziness, weakness, HA, dysphagia, dysarthria, changes in vision, CP/chest discomfort, palpitations, SOB, cough, PND, orthopnea, N/V/D/C, abdominal pain, dysuria, urinary urgency/increased frequency, changes in bowel movements, melena, hematochezia, LE edema, or joint pain. ROS otherwise negative.    In the ED: 2.5L NS, Toradol 15mg 1x, 975mg tylenol, vanc 1.5g, unasyn 3g  Initial vital signs: T: 101.2 HR: 99 , BP: 119/76, R: 18, SpO2: 99% on RA  Labs: significant for WBC 11.76 w/PMN predominance, lactate 0.7    1x dose of Vancomycin, 1x Toradol, 1x Acetaminophen, 1x bolus Normal Saline  -Wound culture  -Blood cutlure  Imaging :XR HAND 3 views of the left hand demonstrates surgical screw overlying the navicular bone. No evidence of acute fracture or dislocation. No radiographic evidence of osteomyelitis. . IMPRESSION: No evidence of acute fracture   EKG: NSR    Urinalysis Basic (ED) - ( 23 Aug 2020 16:06 )    Color: Yellow / Appearance: Clear / S.015 / pH: x  Gluc: x / Ketone: NEGATIVE  / Bili: NEGATIVE / Urobili: 0.2 E.U./dL   Blood: x / Protein: NEGATIVE mg/dL / Nitrite: NEGATIVE   Leuk Esterase: NEGATIVE / RBC: x / WBC x   Sq Epi: x / Non Sq Epi: x / Bacteria: x    PAST MEDICAL & SURGICAL HISTORY:  No pertinent past medical history  No significant past surgical history    Home Medications:   Home Medications:  DOXYCYCLINE HYCLATE 100 MG TAB:  (24 Aug 2020 10:20)  OMEPRAZOLE DR 40 MG CAPSULE:  (24 Aug 2020 10:20)  VALACYCLOVIR HCL 1 GRAM TABLET:  (24 Aug 2020 10:20)    Allergies: No Known Allergies, Intolerances    Social History:  Marital Status:  (   )    ( x  ) Single    (   )    (  )   Lives with: ( x ) alone  (  ) children   (  ) spouse   (  ) parents  (  ) other  Recent Travel: Traveled to [place]- Was in pool.   Occupation:   Mobility: Mildly decreased range of motion of left hand digits.  Substance Use (street drugs): ( x ) never used  (  ) other:  Tobacco Usage:  ( x  ) never smoked   (   ) former smoker   (   ) current smoker  (     ) pack year  Alcohol Usage: None (24 Aug 2020 04:50)    Overnight Events:  - No acute events overnight. Tolerating PO, voiding, ambulating, afebrile.  - Medications overnight --> 1x 15 mg toradol overnight (3 AM), 1x dose of 3g unasyn overnight (10 PM), 1X Acetaminophen (10 PM), Infusion of Vancomycin.    Vital Signs Last 24 Hrs  T(C): 36.9 (24 Aug 2020 04:45), Max: 38.4 (23 Aug 2020 09:12)  T(F): 98.4 (24 Aug 2020 04:45), Max: 101.2 (23 Aug 2020 09:12)  HR: 77 (24 Aug 2020 04:45) (77 - 99)  BP: 110/65 (24 Aug 2020 04:45) (110/65 - 149/69)  BP(mean): --  RR: 14 (24 Aug 2020 04:45) (14 - 18)  SpO2: 97% (24 Aug 2020 04:45) (94% - 100%)    MEDICATIONS  (STANDING):  enoxaparin Injectable 40 milliGRAM(s) SubCutaneous every 24 hours  vancomycin  IVPB 1500 milliGRAM(s) IV Intermittent every 12 hours    MEDICATIONS  (PRN):  acetaminophen   Tablet .. 650 milliGRAM(s) Oral every 4 hours PRN Mild Pain (1 - 3)    PHYSICAL EXAM:  GENERAL: NAD, well-developed  HEAD:  Atraumatic, Normocephalic  EYES: EOMI, PERRLA, conjunctiva and sclera clear  NECK: Supple, No JVD, no LAD  CHEST/LUNG: Clear to auscultation bilaterally; No wheeze  HEART: Regular rate and rhythm; No murmurs, rubs, or gallops  ABDOMEN: Soft, Nontender, Nondistended; Bowel sounds present  EXTREMITIES:  2+ Peripheral Pulses, No clubbing, cyanosis, or edema  PSYCH: AAOx3  NEUROLOGY: non-focal  SKIN: 2 cm x 1 cm ulcerated lesion on left dorsum of the hand, + surrounding erythema, (+) TTP, (+) clear discharge (-) Lymphangitic spread. Radial pulse +2, normal capillary refill.  x5 suprapubic/ groin annular 1 cm in diameter, crusty, mildly raised, non pruritic lesion, (-) discharge. (-) Axillary LAD, (-) Groin LAD.     Labs:                        13.3   9.01  )-----------( 160      ( 24 Aug 2020 10:11 )             39.3     08-  136  |  101  |  11  ----------------------------<  104<H>  3.8   |  27  |  1.04  Ca    8.8      23 Aug 2020 10:28  TPro  7.3  /  Alb  3.6  /  TBili  0.6  /  DBili  x   /  AST  32  /  ALT  41  /  AlkPhos  56  08-23  PT/INR - ( 23 Aug 2020 10:28 )   PT: 13.3 sec;   INR: 1.13     PTT - ( 23 Aug 2020 10:28 )  PTT:24.5 sec 45y M no pmhx presented to [facility] c/o left hand pain and mike wound. Yesterday morning, the pt woke up w/nonradiating sharp elbow pain (6/10) and discovered that his left hand was red, swollen, and painful (sharp/nonradiating 8/10) on flexion of digits. Ice and Tylenol relieved the pain. There was an accompanying blister on his dorsal hand that was leaking yellow pus when applying ice. Later that day, he developed sxs of general malaise, sweats, chills, and fever to 100F. Of note the pt is a  on [place] and [facility] near [facility] and reports to have been at the firehouse Saturday before the symptoms started. He denies IVDA, trauma, bites to the affected area, but admits to travel to [place] 1 week ago where he got in the pool and hot tub. While on his trip to [place] he also had unprotected sex with a female partner. He reports two days after his trip he first noticed skin lesion in his suprapubic/ groin region. He reports the skin lesions were discharging a green fluid and it is unclear if it was purulent. The skin lesions were mildly painful when boxer briefs pressed on them, but otherwise non-pain/ non pruiritc when left alone. He denies any dysuria, changes in urine, and reports that his partner did not have any urinary symptoms either. It has been more than one year since he was tested for STD's.  Of Note, patient reports to have been to urgent care center 4 days ago due to continued groin/suprapubic rash and was prescribed doxycycline and valacyclovir.  He reports his PCP passed away recently and he has not visited a primary care provider in more than 1 year.        On ROS, Patient denies: ROS negative except for HPI. Myalgias, dizziness, weakness, HA, dysphagia, dysarthria, changes in vision, CP/chest discomfort, palpitations, SOB, cough, PND, orthopnea, N/V/D/C, abdominal pain, dysuria, urinary urgency/increased frequency, changes in bowel movements, melena, hematochezia, LE edema, or joint pain. ROS otherwise negative.    In the ED: 2.5L NS, Toradol 15mg 1x, 975mg tylenol, vanc 1.5g, unasyn 3g  Initial vital signs: T: 101.2 HR: 99 , BP: 119/76, R: 18, SpO2: 99% on RA  Labs: significant for WBC 11.76 w/PMN predominance, lactate 0.7    1x dose of Vancomycin, 1x Toradol, 1x Acetaminophen, 1x bolus Normal Saline  -Wound culture  -Blood cutlure  Imaging :XR HAND 3 views of the left hand demonstrates surgical screw overlying the navicular bone. No evidence of acute fracture or dislocation. No radiographic evidence of osteomyelitis. . IMPRESSION: No evidence of acute fracture   EKG: NSR    Urinalysis Basic (ED) - ( 23 Aug 2020 16:06 )    Color: Yellow / Appearance: Clear / S.015 / pH: x  Gluc: x / Ketone: NEGATIVE  / Bili: NEGATIVE / Urobili: 0.2 E.U./dL   Blood: x / Protein: NEGATIVE mg/dL / Nitrite: NEGATIVE   Leuk Esterase: NEGATIVE / RBC: x / WBC x   Sq Epi: x / Non Sq Epi: x / Bacteria: x    PAST MEDICAL & SURGICAL HISTORY:  No pertinent past medical history  No significant past surgical history    Home Medications:   Home Medications:  DOXYCYCLINE HYCLATE 100 MG TAB:  (24 Aug 2020 10:20)  OMEPRAZOLE DR 40 MG CAPSULE:  (24 Aug 2020 10:20)  VALACYCLOVIR HCL 1 GRAM TABLET:  (24 Aug 2020 10:20)    Allergies: No Known Allergies, Intolerances    Social History:  Marital Status:  (   )    ( x  ) Single    (   )    (  )   Lives with: ( x ) alone  (  ) children   (  ) spouse   (  ) parents  (  ) other  Recent Travel: Traveled to [place]- Was in pool.   Occupation:   Mobility: Mildly decreased range of motion of left hand digits.  Substance Use (street drugs): ( x ) never used  (  ) other:  Tobacco Usage:  ( x  ) never smoked   (   ) former smoker   (   ) current smoker  (     ) pack year  Alcohol Usage: None (24 Aug 2020 04:50)    Overnight Events:  - No acute events overnight. Tolerating PO, voiding, ambulating, afebrile.  - Medications overnight --> 1x 15 mg toradol overnight (3 AM), 1x dose of 3g unasyn overnight (10 PM), 1X Acetaminophen (10 PM), Infusion of Vancomycin.    Vital Signs Last 24 Hrs  T(C): 36.9 (24 Aug 2020 04:45), Max: 38.4 (23 Aug 2020 09:12)  T(F): 98.4 (24 Aug 2020 04:45), Max: 101.2 (23 Aug 2020 09:12)  HR: 77 (24 Aug 2020 04:45) (77 - 99)  BP: 110/65 (24 Aug 2020 04:45) (110/65 - 149/69)  BP(mean): --  RR: 14 (24 Aug 2020 04:45) (14 - 18)  SpO2: 97% (24 Aug 2020 04:45) (94% - 100%)    MEDICATIONS  (STANDING):  enoxaparin Injectable 40 milliGRAM(s) SubCutaneous every 24 hours  vancomycin  IVPB 1500 milliGRAM(s) IV Intermittent every 12 hours    MEDICATIONS  (PRN):  acetaminophen   Tablet .. 650 milliGRAM(s) Oral every 4 hours PRN Mild Pain (1 - 3)    PHYSICAL EXAM:  GENERAL: NAD, well-developed  HEAD:  Atraumatic, Normocephalic  EYES: EOMI, PERRLA, conjunctiva and sclera clear  NECK: Supple, No JVD, no LAD  CHEST/LUNG: Clear to auscultation bilaterally; No wheeze  HEART: Regular rate and rhythm; No murmurs, rubs, or gallops  ABDOMEN: Soft, Nontender, Nondistended; Bowel sounds present  EXTREMITIES:  2+ Peripheral Pulses, No clubbing, cyanosis, or edema  PSYCH: AAOx3  NEUROLOGY: non-focal  SKIN: 2 cm x 1 cm ulcerated lesion on left dorsum of the hand, + surrounding erythema, (+) TTP, (+) clear discharge (-) Lymphangitic spread. Radial pulse +2, normal capillary refill.  x5 suprapubic/ groin annular 1 cm in diameter, crusty, mildly raised, non pruritic lesion, (-) discharge. (-) Axillary LAD, (-) Groin LAD.     Labs:                        13.3   9.01  )-----------( 160      ( 24 Aug 2020 10:11 )             39.3     08-23  136  |  101  |  11  ----------------------------<  104<H>  3.8   |  27  |  1.04  Ca    8.8      23 Aug 2020 10:28  TPro  7.3  /  Alb  3.6  /  TBili  0.6  /  DBili  x   /  AST  32  /  ALT  41  /  AlkPhos  56  08-23  PT/INR - ( 23 Aug 2020 10:28 )   PT: 13.3 sec;   INR: 1.13     PTT - ( 23 Aug 2020 10:28 )  PTT:24.5 sec

## 2020-08-24 NOTE — DISCHARGE NOTE PROVIDER - PROVIDER TOKENS
PROVIDER:[TOKEN:[[id number]]],PROVIDER:[TOKEN:[[id number]]],FREE:[LAST:[Rachel],PHONE:[(   )    -],FAX:[(   )    -],ADDRESS:[[place] [address]]] PROVIDER:[TOKEN:[[id number]],SCHEDULEDAPPT:[09/21/2020],SCHEDULEDAPPTTIME:[11:00 AM]],PROVIDER:[TOKEN:[[id number]]]

## 2020-08-24 NOTE — ED ADULT NURSE REASSESSMENT NOTE - GENERAL PATIENT STATE
comfortable appearance/cooperative
cooperative/comfortable appearance/smiling/interactive
resting/sleeping/comfortable appearance

## 2020-08-24 NOTE — CONSULT NOTE ADULT - ASSESSMENT
46yo gentleman who p/w acute onset erythema, swelling and pain of the L-hand, subsequently found to have sepsis 2/2 purulent cellulitis. ID consulted for evaluation of LUE cellulitis and swelling.    #LUE cellulitis  Patient with recent, acute onset LUE cellulitis with associated purulent drainage concerning for possible MRSA cellulitis in conjunction with possible folliculitis vs. mrsa cellulitis of abdomen. Given patient with possible exposures such as decrepit buildings which would be concerning for certain ricketssia strains. Additionally given patient with recent unprotected sexual encounter STIs should be included in differential as well. Patient without cat exposure or with gardening exposure that would be indicative for sporothrix. Given this     Recommendations  - Please send rickettsial serologies    - Urine GC amplification  - MRSA swab  - Syphilis screen   - Cephalexin 500mg q6hrs for strep coverage  - Doxycycline 100mg BID- For MRSA coverage as well as any rickettsial infection     ID team 1 will continue to follow

## 2020-08-24 NOTE — H&P ADULT - ATTENDING COMMENTS
Patient was seen and examined with the resident team today.  I agree with Dr. Falcon's assessment and plan with the following exceptions/additions:     Briefly, this is a 44yo gentleman who p/w acute onset erythema, swelling and pain of the L-hand, subsequently found to have sepsis 2/2 purulent cellulitis.  Etiology of cellulitis unclear as patient reforms findings c/f a bite NOS; however, recently did have folliculitis v staph infection in his hypogastrium, as well as unprotected intercourse with a new partner.      -- t/b with ID as pt has been on Doxycycline and Valtrex x 48 hours prior to hand lesion  -- please demarcate erythema  -- [facility] [place] feb[place]   -- please add-on RPR   -- pending progress of erythema, may or may not need Hand Surgery to evaluate  -- DVT PPx - Lovenox  -- Dispo - TBD     Naheed Ricks  [phone]

## 2020-08-24 NOTE — CONSULT NOTE ADULT - ATTENDING COMMENTS
Agree with above.  Community acquired MRSA/MSSA folliculitis/cellulitis would be a unifying diagnosis.  Can also consider rickettsial diseases although the lesion does not have central eschar which is found in rickettsia lesions.  R/O syphilis and gonorrhea

## 2020-08-24 NOTE — H&P ADULT - ASSESSMENT
46yo presents with erythema, swelling, and pain of the left hand found to be septic likely 2/2 to cellulitis s/p vanc/unasyn. Admitted to medicine for further management.

## 2020-08-24 NOTE — H&P ADULT - PROBLEM SELECTOR PLAN 3
Fluids:   Electrolytes: Mg>2, K>4  Nutrition:  No IVF currently needed, replete lytes PRN  Prophylaxis: Lovenox    Activity: AAT, OOBTC  GI: none  C: FC  Dispo: Admit to [facility] Fluids: s/p 2.5L NS  Electrolytes: Mg>2, K>4  Nutrition:  No IVF currently needed, replete lytes PRN  Prophylaxis: Lovenox    Activity: AAT, OOBTC  GI: none  C: FC  Dispo: Admit to F

## 2020-08-24 NOTE — DISCHARGE NOTE PROVIDER - NSDCMRMEDTOKEN_GEN_ALL_CORE_FT
naproxen 500 mg oral tablet: 1 tab(s) orally 2 times a day  OMEPRAZOLE DR 40 MG CAPSULE: Keflex 500 mg oral capsule: 1 cap(s) orally 4 times a day  naproxen 500 mg oral tablet: 1 tab(s) orally 2 times a day  OMEPRAZOLE DR 40 MG CAPSULE: Keflex 500 mg oral capsule: 1 cap(s) orally 4 times a day  naproxen 500 mg oral tablet: 1 tab(s) orally 2 times a day  OMEPRAZOLE DR 40 MG CAPSULE: 1 dose(s) orally once a day

## 2020-08-24 NOTE — DISCHARGE NOTE PROVIDER - NSDCFUSCHEDAPPT_GEN_ALL_CORE_FT
EFRA SANTOS ; 09/21/2020 ; [facility]  [address] EFRA SANTOS ; 09/21/2020 ; [facility]  [address]

## 2020-08-24 NOTE — H&P ADULT - HISTORY OF PRESENT ILLNESS
**Incomplete Note*    45yM no pmhx. On ROS, Patient denies: fevers, chills, myalgias, dizziness, weakness, HA, dysphagia, dysarthria, changes in vision, CP/chest discomfort, palpitations, SOB, cough, PND, orthopnea, N/V/D/C, abdominal pain, dysuria, urinary urgency/increased frequency, changes in bowel movements, melena, hematochezia, LE edema, or joint pain. ROS otherwise negative.      In the ED: 2.5L NS, Toradol 15mg 1x, 975mg tylenol, vanc 1.5g, unasyn 3g  Initial vital signs: T: 101.2 HR: 99 , BP: 119/76, R: 18, SpO2: 99% on RA  Labs: significant for WBC 11.76 w/PMN predominance, lactate 0.7  Imaging:  Left hand XR: no evidence of fx  EKG: NSR    Pt seen and examined at bedside, NAD, Patient denies: fevers, chills, myalgias, dizziness, weakness, HA, dysphagia, dysarthria, changes in vision, CP/chest discomfort, palpitations, SOB, cough, PND, orthopnea, N/V/D/C, abdominal pain, dysuria, urinary urgency/increased frequency, changes in bowel movements, melena, hematochezia, LE edema, or joint pain. ROS otherwise negative. 45yM no pmhx presented to [facility] c/o left hand pain and mike wound. Yesterday morning, the pt woke up w/nonradiating sharp elbow pain and discovered that his left hand was red, swollen (8/10), and painful on flexion of digits. Ice and tylenol relieved the pain. There was an accompanying blister on his mike that was leaking yellow pus when applying ice. Later that day, he developed sxs of general malaise, sweats, chills, and fever to 100F. Of note the pt is a  on [place] [address] [place] [facility], he denies IVDA, any trauma, bites to the affected area, or recent travel hx. On ROS, Patient denies:  myalgias, dizziness, weakness, HA, dysphagia, dysarthria, changes in vision, CP/chest discomfort, palpitations, SOB, cough, PND, orthopnea, N/V/D/C, abdominal pain, dysuria, urinary urgency/increased frequency, changes in bowel movements, melena, hematochezia, LE edema, or joint pain. ROS otherwise negative.      In the ED: 2.5L NS, Toradol 15mg 1x, 975mg tylenol, vanc 1.5g, unasyn 3g  Initial vital signs: T: 101.2 HR: 99 , BP: 119/76, R: 18, SpO2: 99% on RA  Labs: significant for WBC 11.76 w/PMN predominance, lactate 0.7  Imaging:  Left hand XR: no evidence of fx  EKG: NSR    Pt seen and examined at bedside, NAD, Patient denies: fevers, chills, myalgias, dizziness, weakness, HA, dysphagia, dysarthria, changes in vision, CP/chest discomfort, palpitations, SOB, cough, PND, orthopnea, N/V/D/C, abdominal pain, dysuria, urinary urgency/increased frequency, changes in bowel movements, melena, hematochezia, LE edema, or joint pain. ROS otherwise negative. 45yM no pmhx presented to [facility] c/o left hand pain and mike wound. Yesterday morning, the pt woke up w/nonradiating sharp elbow pain (6/10) and discovered that his left hand was red, swollen, and painful (sharp/nonradiating 8/10) on flexion of digits. Ice and Tylenol relieved the pain. There was an accompanying blister on his mike that was leaking yellow pus when applying ice. Later that day, he developed sxs of general malaise, sweats, chills, and fever to 100F. Of note the pt is a  on [place] [address] [place] [facility], he denies IVDA, trauma, bites to the affected area, or recent travel hx. On ROS, Patient denies:  Myalgias, dizziness, weakness, HA, dysphagia, dysarthria, changes in vision, CP/chest discomfort, palpitations, SOB, cough, PND, orthopnea, N/V/D/C, abdominal pain, dysuria, urinary urgency/increased frequency, changes in bowel movements, melena, hematochezia, LE edema, or joint pain. ROS otherwise negative.    In the ED: 2.5L NS, Toradol 15mg 1x, 975mg tylenol, vanc 1.5g, unasyn 3g  Initial vital signs: T: 101.2 HR: 99 , BP: 119/76, R: 18, SpO2: 99% on RA  Labs: significant for WBC 11.76 w/PMN predominance, lactate 0.7  Imaging:  Left hand XR: no evidence of fx  EKG: NSR    Pt seen and examined at bedside, NAD, Patient denies: fevers, chills, myalgias, dizziness, weakness, HA, dysphagia, dysarthria, changes in vision, CP/chest discomfort, palpitations, SOB, cough, PND, orthopnea, N/V/D/C, abdominal pain, dysuria, urinary urgency/increased frequency, changes in bowel movements, melena, hematochezia, LE edema, or joint pain. ROS otherwise negative.

## 2020-08-24 NOTE — H&P ADULT - PROBLEM SELECTOR PLAN 1
Left hand cellulitis with concern of erythema spreading to LUE @ [facility], no hx of trauma/IVDA, vitals sig for 101.2F, HR 99, marked erythema of left hand up to? w/no fluctuance, LUE neurovascularly intact, lactate 0.7 and has UOP, left hand XR no evidence of fx, sepsis likely 2/2 to sepsis  -f/u BCX  -monitor cellulitis w/demarcation  -c/w Vanc 1.5mg BID (trough before 4th dose), received 1 dose so far w/next one due @10am 08/24/2020 Sxs of fevers, chills, malaise, and left hand cellulitis w/purulent carbuncle with concern of erythema spreading to LUE @ [facility], no hx of trauma/IVDA, vitals sig for 101.2F, HR 99, marked erythema of left hand up to left AC w/no fluctuance, LUE neurovascularly intact, lactate 0.7 and has UOP, left hand XR no evidence of fx, sepsis likely 2/2 to sepsis  -f/u BCX and wound culture   -monitor cellulitis w/demarcation  -c/w Vanc 1.5mg BID (trough before 4th dose), received 1 dose so far w/next one due @10am 08/24/2020  -If worsening erythema, will consider hand consult for I&D Sxs of fevers, chills, malaise, and left hand cellulitis w/purulent carbuncle with concern of erythema spreading to LUE @ [facility], no hx of trauma/IVDA, vitals sig for 101.2F, HR 99, marked erythema of left hand up to left AC w/no fluctuance, LUE neurovascularly intact, lactate 0.7 and has UOP, left hand XR no evidence of fx, sepsis likely 2/2 to sepsis  -f/u BCX and wound culture   -monitor cellulitis w/demarcation  -c/w Vanc 1.5mg BID (trough before 4th dose, goal of 10-15), received 1 dose so far w/next one due @10am 08/24/2020  -If worsening erythema, will consider hand consult for I&D Sxs of fevers, chills, malaise, and left hand cellulitis w/purulent carbuncle with concern of erythema spreading to LUE @ [facility], no hx of trauma/IVDA, vitals sig for 101.2F, HR 99, marked erythema of left hand up to left AC w/no fluctuance, LUE neurovascularly intact, lactate 0.7 and has UOP, left hand XR no evidence of fx, sepsis likely 2/2 to sepsis  -f/u BCX and wound culture   -Nursing wound care, appreciated   -monitor cellulitis w/demarcation  -c/w Vanc 1.5mg BID (trough before 4th dose, goal of 10-15), received 1 dose so far w/next one due @10am 08/24/2020  -If worsening erythema, will consider hand consult for I&D Sxs of fevers, chills, malaise, and left hand cellulitis w/purulent carbuncle with concern of erythema spreading to LUE @ [facility], no hx of trauma/IVDA, vitals sig for 101.2F, HR 99, marked erythema of left hand up to left AC, Carbuncle expressing purulent exudate on exam however no fluctuance noted, LUE neurovascularly intact, lactate 0.7 and has UOP, left hand XR no evidence of fx, sepsis likely 2/2 to sepsis  -f/u BCX and wound culture   -Nursing wound care, appreciated   -monitor cellulitis w/demarcation  -c/w Vanc 1.5mg BID (trough before 4th dose, goal of 10-15), received 1 dose so far w/next one due @10am 08/24/2020  -If worsening erythema, will consider hand consult for I&D

## 2020-08-24 NOTE — CONSULT NOTE ADULT - SUBJECTIVE AND OBJECTIVE BOX
HPI:  45yM no pmhx presented to [facility] c/o left hand pain and mike wound. Yesterday morning, the pt woke up w/nonradiating sharp elbow pain (6/10) and discovered that his left hand was red, swollen, and painful (sharp/nonradiating 8/10) on flexion of digits. Ice and Tylenol relieved the pain. There was an accompanying blister on his mike that was leaking yellow pus when applying ice. Later that day, he developed sxs of general malaise, sweats, chills, and fever to 100F. Of note the pt is a  on [place] [address] near [facility], he denies IVDA, trauma, bites to the affected area, or recent travel hx. On ROS, Patient denies:  Myalgias, dizziness, weakness, HA, dysphagia, dysarthria, changes in vision, CP/chest discomfort, palpitations, SOB, cough, PND, orthopnea, N/V/D/C, abdominal pain, dysuria, urinary urgency/increased frequency, changes in bowel movements, melena, hematochezia, LE edema, or joint pain. ROS otherwise negative.  In the ED: 2.5L NS, Toradol 15mg 1x, 975mg tylenol, vanc 1.5g, unasyn 3g  Initial vital signs: T: 101.2 HR: 99 , BP: 119/76, R: 18, SpO2: 99% on RA  Labs: significant for WBC 11.76 w/PMN predominance, lactate 0.7  Imaging:  Left hand XR: no evidence of fx  EKG: NSR  Patient admitted to hospital for further management, ID consulted for further evaluation of upper extremity cellulitis with purulent carbuncle and associated purulent carbuncles of suprapubic area. Patient with no recent trauma, no gardening exposure. Possible animal exposure as patient with has been in [employer] buildings. Endorses improvement in symptoms currently.       Home Medications:  OMEPRAZOLE DR 40 MG CAPSULE:  (24 Aug 2020 11:04)    PAST MEDICAL & SURGICAL HISTORY:  No pertinent past medical history  No significant past surgical history      FAMILY HISTORY:    Social History:  Marital Status:  (   )    (   ) Single    (   )    (  )   Lives with: (  ) alone  (  ) children   (  ) spouse   (  ) parents  (  ) other  Recent Travel: No recent travel  Occupation:  Mobility:   Substance Use (street drugs): ( x ) never used  (  ) other:  Tobacco Usage:  ( x  ) never smoked   (   ) former smoker   (   ) current smoker  (     ) pack year  Alcohol Usage: None (24 Aug 2020 04:50)      Vital Signs (24 Hrs):  T(C): 37.2 (20 @ 16:17), Max: 38.4 (20 @ 20:09)  HR: 72 (20 @ 16:17) (72 - 86)  BP: 124/83 (20 @ 16:17) (110/65 - 149/69)  RR: 16 (20 @ 16:17) (14 - 18)  SpO2: 97% (20 @ 16:17) (94% - 98%)  Wt(kg): --      PHYSICAL EXAM:  GENERAL: NAD. Obese  HEENT: MMM   NECK: -JVD  CHEST/LUNG: No accessory muscle use  HEART: RRR  ABDOMEN: scattered, crusted, non-painful lesions, c no purulence  EXTREMITIES:  2+ Peripheral Pulses, No edema  PSYCH: AAOx3, cooperative, appropriate  NEUROLOGY: AAOx3, CN II-XII intact. Strength 5/5 throughout. Sensation intact. Appropriate cerebellar functions.   SKIN: No rashes or lesions      .  LABS:                         13.3   9.01  )-----------( 160      ( 24 Aug 2020 10:11 )             39.3         141  |  108  |  7   ----------------------------<  101<H>  4.1   |  27  |  0.83    Ca    8.5      24 Aug 2020 10:11  Phos  2.3       Mg     1.7         TPro  7.3  /  Alb  3.6  /  TBili  0.6  /  DBili  x   /  AST  32  /  ALT  41  /  AlkPhos  56      PT/INR - ( 23 Aug 2020 10:28 )   PT: 13.3 sec;   INR: 1.13          PTT - ( 23 Aug 2020 10:28 )  PTT:24.5 sec  Urinalysis Basic - ( 23 Aug 2020 16:06 )    Color: Yellow / Appearance: Clear / S.015 / pH: x  Gluc: x / Ketone: NEGATIVE  / Bili: NEGATIVE / Urobili: 0.2 E.U./dL   Blood: x / Protein: NEGATIVE mg/dL / Nitrite: NEGATIVE   Leuk Esterase: NEGATIVE / RBC: x / WBC x   Sq Epi: x / Non Sq Epi: x / Bacteria: x        Culture - Blood (collected 23 Aug 2020 14:55)  Source: .Blood Blood-Peripheral  Preliminary Report (24 Aug 2020 15:02):    No growth to date.    Culture - Blood (collected 23 Aug 2020 14:55)  Source: .Blood Blood-Peripheral  Preliminary Report (24 Aug 2020 15:02):    No growth to date.

## 2020-08-24 NOTE — DISCHARGE NOTE PROVIDER - CARE PROVIDERS DIRECT ADDRESSES
,[email or web address],DirectAddress_Unknown ,[email or web address],DirectAddress_Unknown

## 2020-08-25 ENCOUNTER — TRANSCRIPTION ENCOUNTER (OUTPATIENT)
Age: 45
End: 2020-08-25

## 2020-08-25 VITALS
RESPIRATION RATE: 16 BRPM | DIASTOLIC BLOOD PRESSURE: 73 MMHG | SYSTOLIC BLOOD PRESSURE: 115 MMHG | TEMPERATURE: 98 F | OXYGEN SATURATION: 97 % | HEART RATE: 69 BPM

## 2020-08-25 PROBLEM — Z78.9 OTHER SPECIFIED HEALTH STATUS: Chronic | Status: ACTIVE | Noted: 2020-08-23

## 2020-08-25 PROBLEM — Z00.00 ENCOUNTER FOR PREVENTIVE HEALTH EXAMINATION: Status: ACTIVE | Noted: 2020-08-25

## 2020-08-25 LAB
ANION GAP SERPL CALC-SCNC: 10 MMOL/L — SIGNIFICANT CHANGE UP (ref 5–17)
BASOPHILS # BLD AUTO: 0.05 K/UL — SIGNIFICANT CHANGE UP (ref 0–0.2)
BASOPHILS NFR BLD AUTO: 0.8 % — SIGNIFICANT CHANGE UP (ref 0–2)
BUN SERPL-MCNC: 7 MG/DL — SIGNIFICANT CHANGE UP (ref 7–23)
CALCIUM SERPL-MCNC: 8.9 MG/DL — SIGNIFICANT CHANGE UP (ref 8.4–10.5)
CHLORIDE SERPL-SCNC: 107 MMOL/L — SIGNIFICANT CHANGE UP (ref 96–108)
CO2 SERPL-SCNC: 26 MMOL/L — SIGNIFICANT CHANGE UP (ref 22–31)
CREAT SERPL-MCNC: 0.81 MG/DL — SIGNIFICANT CHANGE UP (ref 0.5–1.3)
CULTURE RESULTS: NO GROWTH — SIGNIFICANT CHANGE UP
CULTURE RESULTS: SIGNIFICANT CHANGE UP
EOSINOPHIL # BLD AUTO: 0.28 K/UL — SIGNIFICANT CHANGE UP (ref 0–0.5)
EOSINOPHIL NFR BLD AUTO: 4.7 % — SIGNIFICANT CHANGE UP (ref 0–6)
GLUCOSE SERPL-MCNC: 103 MG/DL — HIGH (ref 70–99)
HCT VFR BLD CALC: 40.2 % — SIGNIFICANT CHANGE UP (ref 39–50)
HGB BLD-MCNC: 13.5 G/DL — SIGNIFICANT CHANGE UP (ref 13–17)
IMM GRANULOCYTES NFR BLD AUTO: 0.3 % — SIGNIFICANT CHANGE UP (ref 0–1.5)
LYMPHOCYTES # BLD AUTO: 1.18 K/UL — SIGNIFICANT CHANGE UP (ref 1–3.3)
LYMPHOCYTES # BLD AUTO: 19.6 % — SIGNIFICANT CHANGE UP (ref 13–44)
MAGNESIUM SERPL-MCNC: 1.6 MG/DL — SIGNIFICANT CHANGE UP (ref 1.6–2.6)
MCHC RBC-ENTMCNC: 31.4 PG — SIGNIFICANT CHANGE UP (ref 27–34)
MCHC RBC-ENTMCNC: 33.6 GM/DL — SIGNIFICANT CHANGE UP (ref 32–36)
MCV RBC AUTO: 93.5 FL — SIGNIFICANT CHANGE UP (ref 80–100)
MONOCYTES # BLD AUTO: 0.55 K/UL — SIGNIFICANT CHANGE UP (ref 0–0.9)
MONOCYTES NFR BLD AUTO: 9.2 % — SIGNIFICANT CHANGE UP (ref 2–14)
NEUTROPHILS # BLD AUTO: 3.93 K/UL — SIGNIFICANT CHANGE UP (ref 1.8–7.4)
NEUTROPHILS NFR BLD AUTO: 65.4 % — SIGNIFICANT CHANGE UP (ref 43–77)
NRBC # BLD: 0 /100 WBCS — SIGNIFICANT CHANGE UP (ref 0–0)
PHOSPHATE SERPL-MCNC: 3.3 MG/DL — SIGNIFICANT CHANGE UP (ref 2.5–4.5)
PLATELET # BLD AUTO: 183 K/UL — SIGNIFICANT CHANGE UP (ref 150–400)
POTASSIUM SERPL-MCNC: 3.9 MMOL/L — SIGNIFICANT CHANGE UP (ref 3.5–5.3)
POTASSIUM SERPL-SCNC: 3.9 MMOL/L — SIGNIFICANT CHANGE UP (ref 3.5–5.3)
RBC # BLD: 4.3 M/UL — SIGNIFICANT CHANGE UP (ref 4.2–5.8)
RBC # FLD: 13 % — SIGNIFICANT CHANGE UP (ref 10.3–14.5)
SODIUM SERPL-SCNC: 143 MMOL/L — SIGNIFICANT CHANGE UP (ref 135–145)
SPECIMEN SOURCE: SIGNIFICANT CHANGE UP
SPECIMEN SOURCE: SIGNIFICANT CHANGE UP
WBC # BLD: 6.01 K/UL — SIGNIFICANT CHANGE UP (ref 3.8–10.5)
WBC # FLD AUTO: 6.01 K/UL — SIGNIFICANT CHANGE UP (ref 3.8–10.5)

## 2020-08-25 PROCEDURE — 86757 RICKETTSIA ANTIBODY: CPT

## 2020-08-25 PROCEDURE — 80048 BASIC METABOLIC PNL TOTAL CA: CPT

## 2020-08-25 PROCEDURE — 99285 EMERGENCY DEPT VISIT HI MDM: CPT | Mod: 25

## 2020-08-25 PROCEDURE — G0378: CPT

## 2020-08-25 PROCEDURE — 93005 ELECTROCARDIOGRAM TRACING: CPT

## 2020-08-25 PROCEDURE — 80053 COMPREHEN METABOLIC PANEL: CPT

## 2020-08-25 PROCEDURE — 99239 HOSP IP/OBS DSCHRG MGMT >30: CPT | Mod: GC

## 2020-08-25 PROCEDURE — 87591 N.GONORRHOEAE DNA AMP PROB: CPT

## 2020-08-25 PROCEDURE — 96366 THER/PROPH/DIAG IV INF ADDON: CPT

## 2020-08-25 PROCEDURE — 83735 ASSAY OF MAGNESIUM: CPT

## 2020-08-25 PROCEDURE — 87641 MR-STAPH DNA AMP PROBE: CPT

## 2020-08-25 PROCEDURE — 81003 URINALYSIS AUTO W/O SCOPE: CPT

## 2020-08-25 PROCEDURE — 96367 TX/PROPH/DG ADDL SEQ IV INF: CPT

## 2020-08-25 PROCEDURE — 83605 ASSAY OF LACTIC ACID: CPT

## 2020-08-25 PROCEDURE — 99232 SBSQ HOSP IP/OBS MODERATE 35: CPT | Mod: GC

## 2020-08-25 PROCEDURE — 85610 PROTHROMBIN TIME: CPT

## 2020-08-25 PROCEDURE — 87040 BLOOD CULTURE FOR BACTERIA: CPT

## 2020-08-25 PROCEDURE — 96376 TX/PRO/DX INJ SAME DRUG ADON: CPT

## 2020-08-25 PROCEDURE — 87086 URINE CULTURE/COLONY COUNT: CPT

## 2020-08-25 PROCEDURE — 36415 COLL VENOUS BLD VENIPUNCTURE: CPT

## 2020-08-25 PROCEDURE — 96375 TX/PRO/DX INJ NEW DRUG ADDON: CPT

## 2020-08-25 PROCEDURE — 87070 CULTURE OTHR SPECIMN AEROBIC: CPT

## 2020-08-25 PROCEDURE — 86780 TREPONEMA PALLIDUM: CPT

## 2020-08-25 PROCEDURE — 85025 COMPLETE CBC W/AUTO DIFF WBC: CPT

## 2020-08-25 PROCEDURE — 84100 ASSAY OF PHOSPHORUS: CPT

## 2020-08-25 PROCEDURE — 87635 SARS-COV-2 COVID-19 AMP PRB: CPT

## 2020-08-25 PROCEDURE — 85730 THROMBOPLASTIN TIME PARTIAL: CPT

## 2020-08-25 PROCEDURE — 96365 THER/PROPH/DIAG IV INF INIT: CPT

## 2020-08-25 PROCEDURE — 73130 X-RAY EXAM OF HAND: CPT

## 2020-08-25 RX ORDER — CEPHALEXIN 500 MG
1 CAPSULE ORAL
Qty: 24 | Refills: 0
Start: 2020-08-25 | End: 2020-08-30

## 2020-08-25 RX ORDER — OMEPRAZOLE 10 MG/1
0 CAPSULE, DELAYED RELEASE ORAL
Qty: 90 | Refills: 0 | DISCHARGE

## 2020-08-25 RX ORDER — CEPHALEXIN 500 MG
500 CAPSULE ORAL
Refills: 0 | Status: DISCONTINUED | OUTPATIENT
Start: 2020-08-25 | End: 2020-08-25

## 2020-08-25 RX ADMIN — ENOXAPARIN SODIUM 40 MILLIGRAM(S): 100 INJECTION SUBCUTANEOUS at 06:26

## 2020-08-25 RX ADMIN — Medication 500 MILLIGRAM(S): at 11:59

## 2020-08-25 RX ADMIN — Medication 100 MILLIGRAM(S): at 06:26

## 2020-08-25 RX ADMIN — Medication 500 MILLIGRAM(S): at 06:26

## 2020-08-25 NOTE — PROGRESS NOTE ADULT - SUBJECTIVE AND OBJECTIVE BOX
INTERVAL HPI/OVERNIGHT EVENTS:    Patient was seen and examined at bedside.  Reports improvement in left hand swelling and erythema; has full range of motion in left hand     CONSTITUTIONAL:  Negative fever or chills, feels well, good appetite  EYES:  Negative  blurry vision or double vision  CARDIOVASCULAR:  Negative for chest pain or palpitations  RESPIRATORY:  Negative for cough, wheezing, or SOB   GASTROINTESTINAL:  Negative for nausea, vomiting, diarrhea, constipation, or abdominal pain  GENITOURINARY:  Negative frequency, urgency or dysuria  NEUROLOGIC:  No headache, confusion, dizziness, lightheadedness      ANTIBIOTICS/RELEVANT:    MEDICATIONS  (STANDING):  cephalexin 500 milliGRAM(s) Oral four times a day  enoxaparin Injectable 40 milliGRAM(s) SubCutaneous every 24 hours  melatonin 5 milliGRAM(s) Oral at bedtime    MEDICATIONS  (PRN):  acetaminophen   Tablet .. 650 milliGRAM(s) Oral every 4 hours PRN Mild Pain (1 - 3)        Vital Signs Last 24 Hrs  T(C): 36.9 (25 Aug 2020 10:20), Max: 37.2 (24 Aug 2020 16:17)  T(F): 98.5 (25 Aug 2020 10:20), Max: 98.9 (24 Aug 2020 16:17)  HR: 69 (25 Aug 2020 10:20) (64 - 78)  BP: 115/73 (25 Aug 2020 10:20) (115/73 - 128/80)  BP(mean): --  RR: 16 (25 Aug 2020 10:20) (16 - 17)  SpO2: 97% (25 Aug 2020 10:20) (96% - 98%)    PHYSICAL EXAM:  Constitutional:  non-toxic, no distress  Eyes:  no icterus   Ear/Nose/Throat: no oral lesion, no sinus tenderness on percussion	  Neck:  supple  Respiratory: CTA anitra  Cardiovascular: S1S2 RRR, no murmurs  Gastrointestinal:soft, (+) BS, no HSM  Extremities: left hand/arm with resolution of swelling and erythema  Vascular: DP Pulse:	right normal; left normal      LABS:                        13.5   6.01  )-----------( 183      ( 25 Aug 2020 06:15 )             40.2     08-25    143  |  107  |  7   ----------------------------<  103<H>  3.9   |  26  |  0.81    Ca    8.9      25 Aug 2020 06:15  Phos  3.3     08-25  Mg     1.6     08-25        Urinalysis Basic - ( 23 Aug 2020 16:06 )    Color: Yellow / Appearance: Clear / S.015 / pH: x  Gluc: x / Ketone: NEGATIVE  / Bili: NEGATIVE / Urobili: 0.2 E.U./dL   Blood: x / Protein: NEGATIVE mg/dL / Nitrite: NEGATIVE   Leuk Esterase: NEGATIVE / RBC: x / WBC x   Sq Epi: x / Non Sq Epi: x / Bacteria: x        MICROBIOLOGY:    Culture - Urine (20 @ 05:36)    Specimen Source: .Urine Clean Catch (Midstream)    Culture Results:   No growth    Culture - Other (20 @ 00:34)    Specimen Source: .Other L hand    Culture Results:   Numerous Streptococcus pyogenes (Group A) "Susceptibilities not performed"        RADIOLOGY & ADDITIONAL STUDIES:

## 2020-08-25 NOTE — PROGRESS NOTE ADULT - ASSESSMENT
IMPRESSION:  Suspect fevers secondary to Strep pyogenes infection.  He is clinically improved    Recommend:  1.  Can stop Doxycycline   2.  Continue Keflex 500 mg PO q6hrs x 7 more days  3.  Follow up other labs as outpatient     ID team 1 will sign off.  Reconsult as needed

## 2020-08-25 NOTE — DISCHARGE NOTE NURSING/CASE MANAGEMENT/SOCIAL WORK - PATIENT PORTAL LINK FT
You can access the FollowMyHealth Patient Portal offered by [facility] by registering at the following website: [email or web address]. By joining [employer]’s FollowMyHealth portal, you will also be able to view your health information using other applications (apps) compatible with our system.

## 2020-08-26 LAB
N GONORRHOEA RRNA SPEC QL NAA+PROBE: SIGNIFICANT CHANGE UP
N GONORRHOEA RRNA SPEC QL NAA+PROBE: SIGNIFICANT CHANGE UP
SPECIMEN SOURCE: SIGNIFICANT CHANGE UP
SPECIMEN SOURCE: SIGNIFICANT CHANGE UP
T PALLIDUM AB TITR SER: NEGATIVE — SIGNIFICANT CHANGE UP

## 2020-08-28 DIAGNOSIS — L03.114 CELLULITIS OF LEFT UPPER LIMB: ICD-10-CM

## 2020-08-28 DIAGNOSIS — A41.9 SEPSIS, UNSPECIFIED ORGANISM: ICD-10-CM

## 2020-08-28 LAB
CULTURE RESULTS: SIGNIFICANT CHANGE UP
CULTURE RESULTS: SIGNIFICANT CHANGE UP
R RICKETTSI AB SER-ACNC: NEGATIVE — SIGNIFICANT CHANGE UP
R RICKETTSI IGM SER-ACNC: 1.02 INDEX — HIGH (ref 0–0.89)
RICK SF IGG TITR SER IF: NEGATIVE — SIGNIFICANT CHANGE UP
RICK SF IGM TITR SER IF: 1.02 INDEX — HIGH (ref 0–0.89)
SPECIMEN SOURCE: SIGNIFICANT CHANGE UP
SPECIMEN SOURCE: SIGNIFICANT CHANGE UP

## 2020-09-21 ENCOUNTER — APPOINTMENT (OUTPATIENT)
Dept: INFECTIOUS DISEASE | Facility: CLINIC | Age: 45
End: 2020-09-21

## 2020-10-10 ENCOUNTER — TRANSCRIPTION ENCOUNTER (OUTPATIENT)
Age: 45
End: 2020-10-10

## 2020-10-14 ENCOUNTER — APPOINTMENT (OUTPATIENT)
Dept: INFECTIOUS DISEASE | Facility: CLINIC | Age: 45
End: 2020-10-14
Payer: COMMERCIAL

## 2020-10-14 VITALS
TEMPERATURE: 98.3 F | HEART RATE: 109 BPM | BODY MASS INDEX: 33.35 KG/M2 | OXYGEN SATURATION: 96 % | SYSTOLIC BLOOD PRESSURE: 145 MMHG | DIASTOLIC BLOOD PRESSURE: 89 MMHG | WEIGHT: 246.25 LBS | HEIGHT: 72 IN

## 2020-10-14 DIAGNOSIS — L03.90 CELLULITIS, UNSPECIFIED: ICD-10-CM

## 2020-10-14 DIAGNOSIS — Z78.9 OTHER SPECIFIED HEALTH STATUS: ICD-10-CM

## 2020-10-14 DIAGNOSIS — L03.818 CELLULITIS OF OTHER SITES: ICD-10-CM

## 2020-10-14 PROCEDURE — 99214 OFFICE O/P EST MOD 30 MIN: CPT

## 2020-10-14 RX ORDER — DEXAMETHASONE 4 MG/1
TABLET ORAL
Refills: 0 | Status: ACTIVE | COMMUNITY

## 2020-10-14 RX ORDER — CEPHALEXIN 500 MG/1
500 TABLET ORAL EVERY 6 HOURS
Qty: 28 | Refills: 0 | Status: ACTIVE | COMMUNITY
Start: 2020-10-14 | End: 1900-01-01

## 2020-10-14 NOTE — PHYSICAL EXAM
[General Appearance - In No Acute Distress] : in no acute distress [General Appearance - Alert] : alert [Sclera] : the sclera and conjunctiva were normal [Outer Ear] : the ears and nose were normal in appearance [Neck Appearance] : the appearance of the neck was normal [Heart Rate And Rhythm] : heart rate was normal and rhythm regular [Abdomen Soft] : soft [Edema] : there was no peripheral edema [Bowel Sounds] : normal bowel sounds [No Palpable Adenopathy] : no palpable adenopathy [Musculoskeletal - Swelling] : no joint swelling [] : no rash [Motor Exam] : the motor exam was normal [No Focal Deficits] : no focal deficits [Oriented To Time, Place, And Person] : oriented to person, place, and time [FreeTextEntry1] : left hand with raised red lesion on dorsum of hand, no drainage, non-tender, + fluid

## 2020-10-14 NOTE — HISTORY OF PRESENT ILLNESS
[FreeTextEntry1] : 45 year old male with GERD was admitted 8/23-8/25 for cellulitis of left hand.  He was treated with vanc/pip-tazo initially then cephalexin and doxycycline.  Wound culture grew Strep pyogenes.  Doxycycline was discontinued and he was discharged to complete cephalexin x 7 days. RMSF IgM was mildly elevated 1.02 (<0.9).  About 4 days ago he developed another abscess on left hand.  He denies trauma.  He went to urgent care and was prescribed cephalexin with improvement.

## 2020-10-19 ENCOUNTER — NON-APPOINTMENT (OUTPATIENT)
Age: 45
End: 2020-10-19

## 2020-10-19 LAB
ALBUMIN SERPL ELPH-MCNC: 4.7 G/DL
ALP BLD-CCNC: 56 U/L
ALT SERPL-CCNC: 105 U/L
ANION GAP SERPL CALC-SCNC: 12 MMOL/L
AST SERPL-CCNC: 67 U/L
BASOPHILS # BLD AUTO: 0.05 K/UL
BASOPHILS NFR BLD AUTO: 0.8 %
BILIRUB SERPL-MCNC: 0.4 MG/DL
BUN SERPL-MCNC: 11 MG/DL
CALCIUM SERPL-MCNC: 9.2 MG/DL
CHLORIDE SERPL-SCNC: 103 MMOL/L
CO2 SERPL-SCNC: 25 MMOL/L
CREAT SERPL-MCNC: 0.86 MG/DL
EOSINOPHIL # BLD AUTO: 0.05 K/UL
EOSINOPHIL NFR BLD AUTO: 0.8 %
GLUCOSE SERPL-MCNC: 86 MG/DL
HCT VFR BLD CALC: 48.5 %
HGB BLD-MCNC: 15.8 G/DL
IMM GRANULOCYTES NFR BLD AUTO: 0.5 %
LYMPHOCYTES # BLD AUTO: 1.35 K/UL
LYMPHOCYTES NFR BLD AUTO: 20.3 %
MAN DIFF?: NORMAL
MCHC RBC-ENTMCNC: 31.3 PG
MCHC RBC-ENTMCNC: 32.6 GM/DL
MCV RBC AUTO: 96 FL
MONOCYTES # BLD AUTO: 0.47 K/UL
MONOCYTES NFR BLD AUTO: 7.1 %
MRSA SPEC QL CULT: NOT DETECTED
NEUTROPHILS # BLD AUTO: 4.7 K/UL
NEUTROPHILS NFR BLD AUTO: 70.5 %
PLATELET # BLD AUTO: 250 K/UL
POTASSIUM SERPL-SCNC: 4.3 MMOL/L
PROT SERPL-MCNC: 7.4 G/DL
R RICKETTSI IGG CSF-ACNC: NEGATIVE
R RICKETTSI IGM CSF-ACNC: 0.54 INDEX
RBC # BLD: 5.05 M/UL
RBC # FLD: 13.4 %
SODIUM SERPL-SCNC: 140 MMOL/L
STAPH AUREUS (SA): NOT DETECTED
WBC # FLD AUTO: 6.65 K/UL

## 2020-10-27 ENCOUNTER — APPOINTMENT (OUTPATIENT)
Dept: INTERNAL MEDICINE | Facility: CLINIC | Age: 45
End: 2020-10-27

## 2021-08-23 NOTE — ED PROVIDER NOTE - GASTROINTESTINAL NEGATIVE STATEMENT, MLM
Buddy notification received for new complex patient enrolled in SN[facility]  Patient discharged from   Dept  placed order for BMP  Pathway Guidelines:  *PCP f/u within 7 days of discharge from the SNF   *Nephrology f/u within 7 days of discharge from SNF (if Nephrology was consulted in the hospital or is already established with nephrologist)     Goals:   *Medication Reivew - Avoid NSAIDS   *Patient to monitor B/P at home  Goal is for SBP not to drop below 130   *Review baseline weight at discharge from facility and ongoing (use first weight at home as baseline)   *Review appetite and hydration   *Patient to monitor temperature at home   *Review an increase in edema since home from the facility    -------------------------------------------------------------------     Chart review completed  Outside records through 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, Chronic Juarez, RUE Fistula and depression  See Problem List for complete list of diagnosis  Patient hospitalized   7/15/2021 - 8/3/2021 (19 days) AT [facility] for COVID-19 virus infection  Patient fully vaccinated with COVID19, second dose administered 5/4/21  Patient discharged to [facility] for STR  Inpatient nephrology consult during hospitalization:  YES         Creatinine levels:  15-Jul 4 09   16-Jul 3 89   17-Jul 3 78   18-Jul 3 58   19-Jul 3 79   20-Jul 3 67   21-Jul 3 77   22-Jul 3 6   23-Jul 3 8   24-Jul 3 63   25-Jul 3 58   26-Jul 3 63   27-Jul 3 42   28-Jul 3 26   29-Jul 3 29   30-Jul 3 55   31-Jul 3 32   1-Aug 3 28   2-Aug 3 27   3-Aug 3 17     11-Aug 2 77        Review of IP CM note indicates patient lives high functioning autistic son in 2 story home, 7 steps to enter  Patient reportedly independent in ADLs with use of SPC for ambulation  DME in home:  RW and SC  Future appointments:   8/31/2021 10:00 AM Appointment with Abdi Holm MD at [address] [place] [address] ([phone] 9/15/2021 10:00 AM Appointment with Abdi Holm MD at [address] [place] [address] ([phone] 9/16/2021 10:00 AM Appointment with THANIA IR 2 (BIPLANE) at [address] [place] [address] Interventional Radiology [phone] 9/22/2021 11:00 AM Appointment with Halle Olsen PA-C at [facility] ([phone])       CM contacted patient and left VM (first attempt) with brief introduction of self, and purpose of this phone call is to assess patient's general wellbeing since discharge home from SNF  Left call back number with request for return call  CM will attempt outreach again tomorrow  Reminders sent to self for day 5 and day 11 outreach  Call routed to PCP with [facility] pathway alert and need to appointment within 7 days post SNF discharge  no abdominal pain, no diarrhea, no nausea and no vomiting. no diarrhea, no nausea and no vomiting.

## 2023-08-08 ENCOUNTER — EMERGENCY (EMERGENCY)
Facility: HOSPITAL | Age: 48
LOS: 1 days | Discharge: HOME | End: 2023-08-08
Attending: EMERGENCY MEDICINE | Admitting: EMERGENCY MEDICINE
Payer: OTHER MISCELLANEOUS

## 2023-08-08 VITALS
SYSTOLIC BLOOD PRESSURE: 150 MMHG | OXYGEN SATURATION: 100 % | TEMPERATURE: 98 F | DIASTOLIC BLOOD PRESSURE: 78 MMHG | HEART RATE: 70 BPM | RESPIRATION RATE: 16 BRPM

## 2023-08-08 DIAGNOSIS — S86.012A STRAIN OF LEFT ACHILLES TENDON, INITIAL ENCOUNTER: ICD-10-CM

## 2023-08-08 DIAGNOSIS — M25.572 PAIN IN LEFT ANKLE AND JOINTS OF LEFT FOOT: ICD-10-CM
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
## 2023-08-08 DIAGNOSIS — S46.912A STRAIN OF UNSPECIFIED MUSCLE, FASCIA AND TENDON AT SHOULDER AND UPPER ARM LEVEL, LEFT ARM, INITIAL ENCOUNTER: ICD-10-CM

## 2023-08-08 PROCEDURE — 73610 X-RAY EXAM OF ANKLE: CPT | Mod: 26,LT

## 2023-08-08 PROCEDURE — 99284 EMERGENCY DEPT VISIT MOD MDM: CPT

## 2023-08-08 PROCEDURE — 73030 X-RAY EXAM OF SHOULDER: CPT | Mod: 26,LT

## 2023-08-08 RX ORDER — IBUPROFEN 200 MG
600 TABLET ORAL ONCE
Refills: 0 | Status: COMPLETED | OUTPATIENT
Start: 2023-08-08 | End: 2023-08-08

## 2023-08-08 RX ADMIN — Medication 600 MILLIGRAM(S): at 18:42

## 2023-08-08 NOTE — ED PROVIDER NOTE - NSFOLLOWUPINSTRUCTIONS_ED_ALL_ED_FT
No work for one week or until cleared to return to work by the [facility] doctor and your orthopedist.  Ibuprofen 600mg every 6 hours as needed for pain.  Use the boot and crutches until you see the orthopedist.  Return at any time if you have any concerns.

## 2023-08-08 NOTE — ED PROVIDER NOTE - CARE PROVIDER_API CALL
Boy Layton  Orthopaedic Surgery  [address] [place] [address]  Phone: [phone]  Fax: [phone]  Follow Up Time:

## 2023-08-08 NOTE — ED PROVIDER NOTE - CARE PLAN
1 Principal Discharge DX:	Achilles tendon sprain, left, initial encounter  Secondary Diagnosis:	Left shoulder strain

## 2023-08-08 NOTE — ED ADULT TRIAGE NOTE - CHIEF COMPLAINT QUOTE
Pt states left shoulder and ankle pain sp slip/ fall. Pt denies hs or loc. no swelling or deformity noted on arrival. Pt denies surgery on shoulder or ankle.

## 2023-08-08 NOTE — ED ADULT NURSE NOTE - NSFALLUNIVINTERV_ED_ALL_ED
Bed/Stretcher in lowest position, wheels locked, appropriate side rails in place/Call bell, personal items and telephone in reach/Instruct patient to call for assistance before getting out of bed/chair/stretcher/Non-slip footwear applied when patient is off stretcher/[place] to call system/Physically safe environment - no spills, clutter or unnecessary equipment/Purposeful proactive rounding/Room/bathroom lighting operational, light cord in reach

## 2023-08-08 NOTE — ED PROVIDER NOTE - CLINICAL SUMMARY MEDICAL DECISION MAKING FREE TEXT BOX
L ankle and shoulder pain, pt is [employer]  injured.  Patient has a private orthopedist on [place].  Pain worse with motion.  Xrays ankle shows calcific tendon achilles left and no fracture or dislocation left shoulder.

## 2023-08-08 NOTE — ED PROVIDER NOTE - PATIENT PORTAL LINK FT
You can access the FollowMyHealth Patient Portal offered by [facility] by registering at the following website: [email or web address]. By joining [employer]’s FollowMyHealth portal, you will also be able to view your health information using other applications (apps) compatible with our system.

## 2023-08-08 NOTE — ED PROVIDER NOTE - OBJECTIVE STATEMENT
L ankle and shoulder pain, pt is [employer]  injured.  Patient has a private orthopedist on [place].  Pain worse with motion.  Xrays ankle shows calcific tendon achilles left and no fracture or dislocation left shoulder.

## 2023-10-13 ENCOUNTER — EMERGENCY (EMERGENCY)
Facility: HOSPITAL | Age: 48
LOS: 1 days | Discharge: HOME | End: 2023-10-13
Attending: EMERGENCY MEDICINE | Admitting: EMERGENCY MEDICINE
Payer: OTHER MISCELLANEOUS

## 2023-10-13 VITALS
RESPIRATION RATE: 18 BRPM | HEART RATE: 107 BPM | OXYGEN SATURATION: 98 % | TEMPERATURE: 98 F | DIASTOLIC BLOOD PRESSURE: 87 MMHG | SYSTOLIC BLOOD PRESSURE: 132 MMHG

## 2023-10-13 VITALS
SYSTOLIC BLOOD PRESSURE: 120 MMHG | HEART RATE: 74 BPM | TEMPERATURE: 98 F | RESPIRATION RATE: 18 BRPM | DIASTOLIC BLOOD PRESSURE: 74 MMHG | OXYGEN SATURATION: 97 %

## 2023-10-13 PROCEDURE — 71046 X-RAY EXAM CHEST 2 VIEWS: CPT | Mod: 26

## 2023-10-13 PROCEDURE — 99284 EMERGENCY DEPT VISIT MOD MDM: CPT

## 2023-10-13 PROCEDURE — 73610 X-RAY EXAM OF ANKLE: CPT | Mod: 26,LT

## 2023-10-13 RX ORDER — IBUPROFEN 200 MG
600 TABLET ORAL ONCE
Refills: 0 | Status: COMPLETED | OUTPATIENT
Start: 2023-10-13 | End: 2023-10-13

## 2023-10-13 RX ORDER — DIAZEPAM 5 MG
10 TABLET ORAL ONCE
Refills: 0 | Status: DISCONTINUED | OUTPATIENT
Start: 2023-10-13 | End: 2023-10-13

## 2023-10-13 RX ORDER — OMEPRAZOLE 10 MG/1
1 CAPSULE, DELAYED RELEASE ORAL
Qty: 90 | Refills: 0 | DISCHARGE

## 2023-10-13 RX ORDER — DIAZEPAM 5 MG
1 TABLET ORAL
Qty: 10 | Refills: 0
Start: 2023-10-13

## 2023-10-13 RX ORDER — IBUPROFEN 200 MG
1 TABLET ORAL
Qty: 20 | Refills: 0
Start: 2023-10-13

## 2023-10-13 RX ADMIN — Medication 10 MILLIGRAM(S): at 10:05

## 2023-10-13 RX ADMIN — Medication 600 MILLIGRAM(S): at 10:05

## 2023-10-13 NOTE — ED PROVIDER NOTE - PHYSICAL EXAMINATION
Gen: Well-developed, well-nourished, NAD, VS as noted by nursing. HEENT: NCAT, mmm   Chest: RRR, nl S1 and S2, no m/r/g. Resp: CTAB, no w/r/r  Abd: nl BS, soft, nt/nd. M/S: no c-spine or vertebral tenderness. moderate right mid back muscle spasm and tenderness. Ext: Warm, dry. Left ankle - tenderness and swelling around achilles tendon. unable to plantarflex left foot.  Neuro: CN II-XII intact, normal and equal strength, sensation, and reflexes bilaterally, speech clear  Psych: AAOx3

## 2023-10-13 NOTE — ED PROVIDER NOTE - CARE PROVIDER_API CALL
Fito Muse  Orthopaedic Surgery  [address]  [place] [address]  Phone: [phone]  Fax: [phone]  Follow Up Time: 1-3 Days

## 2023-10-13 NOTE — ED PROVIDER NOTE - NSFOLLOWUPINSTRUCTIONS_ED_ALL_ED_FT
Achilles Tendon Tear  Muscles, tendons, and bones of the lower leg and foot, with a close-up of a torn Achilles tendon.  The Achilles tendon is a cord-like band that connects the muscles of your lower leg (calf) to your heel. The Achilles tendon is the most common site of tendon tearing (rupture).    What are the causes?  This condition may be caused by:  Stress from a sudden stretching of the tendon. For example, this may occur when you land from a jump or when your heel drops down into a hole on uneven ground.  A hard, direct hit to the tendon.  Pushing off your foot forcefully, such as when sprinting, jumping, or changing direction while running.  What increases the risk?  You are more likely to develop this condition if you:  Are a runner.  Play sports that involve sprinting, running, or jumping.  Play contact sports.  Have a weak Achilles tendon. Tendons can weaken from aging, repeat injuries, and chronic tendinitis.  Are male.  Are 30–55 years of age.  Take a type of antibiotic medicine called quinolones.  What are the signs or symptoms?  Symptoms of this condition include:  Hearing a noise, like a pop or a snap, at the time of injury.  Severe, sudden pain in the back of the ankle.  Swelling and bruising.  Inability to actively point your toes down.  Pain when standing or walking.  A feeling of reduced strength when you step on the affected foot.  How is this diagnosed?  This condition is usually diagnosed based on a physical exam.    You may also have imaging tests, such as:  Ultrasound.  X-rays.  MRI.  How is this treated?  This condition may be treated with:  Rest.  Ice applied to the area.  Pain medicine.  Crutches.  A cast, splint, or other device to keep the ankle from moving (immobilized).  Heel wedges to reduce the stretch on your tendon as it heals.  Surgery. This option may depend on your age and your activity level.  Follow these instructions at home:  Medicines    Take over-the-counter and prescription medicines only as told by your health care provider.  Ask your health care provider if the medicine prescribed to you:  Requires you to avoid driving or using heavy machinery.  Can cause constipation. You may need to take these actions to prevent or treat constipation:  Drink enough fluid to keep your urine pale yellow.  Take over-the-counter or prescription medicines.  Eat foods that are high in fiber, such as beans, whole grains, and fresh fruits and vegetables.  Limit foods that are high in fat and processed sugars, such as fried or sweet foods.  If you have a cast:    Do not stick anything inside the cast to scratch your skin. Doing that increases your risk of infection.  You may put lotion on dry skin around the edges of the cast. Do not put lotion on the skin underneath the cast.  If you have a splint or brace:    Wear it as told by your health care provider. Remove it only as told by your health care provider.  Loosen it if your toes tingle, become numb, or turn cold and blue.  If you have a cast, splint, or brace:    Keep it clean and dry.  Check the skin around it every day. Tell your health care provider about any concerns.  Ask your health care provider when it is safe to drive if you have it on a leg or foot that you use for driving.  Bathing    Do not take baths, swim, or use a hot tub until your health care provider approves. Ask your health care provider if you may take showers. You may only be allowed to take sponge baths.  If the cast, splint, or brace is not waterproof:  Do not let it get wet.  Cover it with a watertight covering when you take a bath or shower.  Managing pain, stiffness, and swelling    Bag of ice on a towel on the skin.  If directed, put ice on the injured area.  If you have a removable splint or brace, remove it as told by your health care provider.  Put ice in a plastic bag.  Place a towel between your skin and the bag or between your cast and the bag.  Leave the ice on for 20 minutes, 2–3 times a day.  Move your toes often to avoid stiffness and to lessen swelling.  Raise (elevate) the injured area above the level of your heart while you are sitting or lying down.  Activity    Return to your normal activities as told by your health care provider. Ask your health care provider what activities are safe for you.  Do not use the injured leg to support your body weight until your health care provider says that you can. Use crutches as told by your health care provider.  Ask your health care provider which exercises are safe for you.  General instructions    Do not put pressure on any part of a cast or splint until it is fully hardened. This may take several hours.  Do not use any products that contain nicotine or tobacco, such as cigarettes, e-cigarettes, and chewing tobacco. These can delay healing. If you need help quitting, ask your health care provider.  Use heel wedges if told by your health care provider.  Keep all follow-up visits as told by your health care provider. This is important.  How is this prevented?  Do exercises exactly as told by your therapist or health care provider and adjust them as directed.  Warm up and stretch before being active.  Cool down and stretch after being active.  Rest between periods of activity.  Use equipment that fits you.  Contact a health care provider if you have:  More pain and swelling.  Pain that is not controlled with medicines.  New symptoms or symptoms that get worse.  Problems moving your toes or foot.  Warmth in your foot.  A fever.  Summary  An Achilles tendon tear is an injury that involves a tear in this tendon.  This injury typically occurs in runners or athletes who play sports that involve sprinting, running, or jumping.  An Achilles tendon tear causes sudden severe pain in your ankle and an inability to point your foot down.  Treatment for this injury may include rest, ice, and pain medicines. In some cases, surgery may be needed.  This information is not intended to replace advice given to you by your health care provider. Make sure you discuss any questions you have with your health care provider.    Thoracic Strain  A thoracic strain is an injury to the muscles or tendons that attach to the upper part of your back behind your chest. Tendons are tissues that connect muscle to bone. This injury is sometimes called a mid-back strain. It happens when a muscle is stretched too far or overloaded.    Thoracic strains can range from mild to severe. Mild strains may involve stretching a muscle or tendon without tearing it. These injuries may heal in 1–2 weeks. More severe strains involve tearing of muscle fibers or tendons. These will cause more pain and may take 6–8 weeks to heal.    What are the causes?  This condition may be caused by:  Trauma, such as a fall or a hit to the body.  Twisting or overstretching the back. This may result from doing activities that take a lot of energy, such as lifting heavy objects.  In some cases, the cause may not be known.    What increases the risk?  This injury is more common in:  Athletes.  People with obesity.  What are the signs or symptoms?  The main symptom of this condition is pain in the middle back, especially with movement. Other symptoms include:  Stiffness or limited range of motion.  Sudden muscle tightening (spasms).  How is this diagnosed?  This condition may be diagnosed based on:  Your symptoms.  Your medical history.  A physical exam.  Imaging tests, such as X-rays, a CT scan, or an MRI.  How is this treated?  This condition may be treated with:  Resting the injured area.  Applying heat and cold to the injured area.  Over-the-counter medicines for pain and inflammation, such as NSAIDs.  Prescription pain medicine or muscle relaxants. These may be needed for a short time.  Doing exercises to improve movement and strength in your back (physical therapy).  Treatments applied to the painful area, such as:  Electrical stimulation.  Stimulating the muscle with small needles (dry needling).  Injections of medicine (trigger point injections).  Follow these instructions at home:  Managing pain, stiffness, and swelling    Bag of ice on a towel on the skin.  A heating pad for use on the affected area.  If told, put ice on the injured area.  Put ice in a plastic bag.  Place a towel between your skin and the bag.  Leave the ice on for 20 minutes, 2–3 times a day.  If told, apply heat to the affected area as often as told by your health care provider. Use the heat source that your provider recommends, such as a moist heat pack or a heating pad.  Place a towel between your skin and the heat source.  Leave the heat on for 20–30 minutes.  If your skin turns bright red, remove the ice or heat right away to prevent skin damage. The risk of damage is higher if you cannot feel pain, heat, or cold.  Activity    Rest and return to your normal activities as told by your provider. Ask your provider what activities are safe for you.  Do exercises as told by your provider.  Medicines    Take over-the-counter and prescription medicines only as told by your provider.  Ask your provider if the medicine prescribed to you:  Requires you to avoid driving or using machinery.  Can cause constipation. You may need to take these actions to prevent or treat constipation:  Drink enough fluid to keep your pee (urine) pale yellow.  Take over-the-counter or prescription medicines.  Eat foods that are high in fiber, such as beans, whole grains, and fresh fruits and vegetables.  Limit foods that are high in fat and processed sugars, such as fried or sweet foods.  General instructions    Do not use any products that contain nicotine or tobacco. These products include cigarettes, chewing tobacco, and vaping devices, such as e-cigarettes. If you need help quitting, ask your provider.  Keep all follow-up visits. Your provider will monitor your injury and activity.  How is this prevented?  A person showing the correct and incorrect way to lift a heavy object. The correct way shows the person's knees bent.  To prevent a future mid-back injury:  Always warm up before physical activity or sports.  Cool down and stretch after being active.  Use correct form when playing sports and lifting heavy objects. Bend your knees before you lift heavy objects.  Use good posture when sitting and standing.  Stay physically fit and maintain a healthy weight.  Do at least 150 minutes of moderate-intensity exercise each week, such as brisk walking or water aerobics.  Do strength exercises at least 2 times each week.  Contact a health care provider if:  Your pain is not helped by medicine.  Your pain or stiffness is getting worse.  You develop pain or stiffness in your neck or lower back.  Get help right away if:  You have shortness of breath.  You have chest pain.  You have numbness, tingling, or weakness in your legs.  You are not able to control when you pee (urinary incontinence).  These symptoms may be an emergency. Get help right away. Call 911.  Do not wait to see if the symptoms will go away.  Do not drive yourself to the hospital.  This information is not intended to replace advice given to you by your health care provider. Make sure you discuss any questions you have with your health care provider.

## 2023-10-13 NOTE — ED ADULT NURSE NOTE - OBJECTIVE STATEMENT
47 y/o male who stepped in a " hole in the floor" c/o left ankle pain and swelling and middle back pain- pt is A+Ox3 - reports previous achilles tear injury back in 2014

## 2023-10-13 NOTE — ED ADULT NURSE NOTE - NSFALLUNIVINTERV_ED_ALL_ED
Bed/Stretcher in lowest position, wheels locked, appropriate side rails in place/Call bell, personal items and telephone in reach/Instruct patient to call for assistance before getting out of bed/chair/stretcher/Non-slip footwear applied when patient is off stretcher/[place] to call system/Physically safe environment - no spills, clutter or unnecessary equipment/Purposeful proactive rounding/Room/bathroom lighting operational, light cord in reach

## 2023-10-13 NOTE — ED ADULT NURSE NOTE - CAS EDN DISCHARGE ASSESSMENT
no Alert and oriented to person, place and time/Patient baseline mental status/Awake/Neuro vascular intact post splinting

## 2023-10-13 NOTE — ED PROVIDER NOTE - OBJECTIVE STATEMENT
Patient reports that he was at work last night as a .  Around 2 AM, while he was wearing all of his gear, he twisted to the side to avoid to hold the floor.  Felt a sudden, severe pain in his right mid back.  Afterwards also realized that he had severe pain in his left posterior ankle.  Has history of multiple Achilles tendon injuries, most recently a partial tear in August.  Has never had surgery on the tendon.  No headache, chest pain, shortness of breath, abdominal pain, radiation of pain, focal weakness, paresthesias.

## 2023-10-13 NOTE — ED ADULT NURSE NOTE - NURSING NEURO LEVEL OF CONSCIOUSNESS
Called [facility] pharmacy and they need Prodigy test strips, pended diabetic testing supplies order that needs to be faxed to [facility] in [place] [address] to Dr. Eric Quarles alert and awake

## 2023-10-13 NOTE — ED PROVIDER NOTE - PROGRESS NOTE DETAILS
Patient resting comfortably, feels moderately improved. now able to partly plantarflex left foot. I called on call ortho Dr. Joshua Gould and left a message. No call back from Dr. Gould. I called his cell again, no answer. Called office. They will send a message to him. Spoke with Dr. Gould. He recommended cam boot and crutches. His office will reach out to patient to make appointment on Monday. Return to the ED immediately if getting worse, not improving, or if having any new or troubling symptoms.
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
## 2023-10-16 DIAGNOSIS — S86.012A STRAIN OF LEFT ACHILLES TENDON, INITIAL ENCOUNTER: ICD-10-CM
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
## 2023-10-16 DIAGNOSIS — M54.6 PAIN IN THORACIC SPINE: ICD-10-CM

## 2023-10-16 DIAGNOSIS — S29.012A STRAIN OF MUSCLE AND TENDON OF BACK WALL OF THORAX, INITIAL ENCOUNTER: ICD-10-CM

## 2023-10-17 ENCOUNTER — NON-APPOINTMENT (OUTPATIENT)
Age: 48
End: 2023-10-17

## 2024-05-17 NOTE — ED ADULT TRIAGE NOTE - PAIN: PRESENCE, MLM
From: Justine Manzano Sr.  To: John Kee MD  Sent: 5/16/2024 9:05 PM CDT  Subject: Ear infection     Hi Dr. Kee,  I went to the urgent walk in clinic on [place], May 5th. Because my left ear started hurting me around April 27th. I thought maybe from putting ear plugs in my ear Jan snoring is very loud. I stopped using the ear plugs that day. To see if it would get better, but it got so bad my iner ear and out ear along my jaw bone started hurting and hurt to eat also headaches. She prescribed me Ofloxacin Otic ear drops. I'm all out and only helped a little. Could you prescribed me drops and antibiotics by month to make this go away please!    Justine  
Ok to refill ofloxacin drops and send in Rx for amoxil 500 mg tid, #30, no refills. Update patient  
complains of pain/discomfort
yes

## 2024-12-04 NOTE — ED ADULT NURSE NOTE - NURSING NEURO ORIENTATION
----- Message from [employer] sent at 12/4/2024 11:09 AM CST -----  Who Called: Damon Moran    Caller is requesting assistance/information from provider's office.    Symptoms (please be specific): n/a   How long has patient had these symptoms:  n/a  List of preferred pharmacies on file (remove unneeded): [unfilled]  If different, enter pharmacy into here including location and phone number: n/a      Preferred Method of Contact: Phone Call  Patient's Preferred Phone Number on File: [phone]   Best Call Back Number, if different:  Additional Information: medical advice, jeison [facility] [phone] called to notify dr mares of pt Rm# [id number], please advise, thanks  
oriented to person, place and time

## 2025-07-17 NOTE — ED PROVIDER NOTE - CARDIAC, MLM
[Negative] : Heme/Lymph Normal rate, regular rhythm.  Heart sounds S1, S2.  No murmurs, rubs or gallops. Normal rate, regular rhythm.   No murmurs, rubs or gallops.